# Patient Record
Sex: MALE | URBAN - METROPOLITAN AREA
[De-identification: names, ages, dates, MRNs, and addresses within clinical notes are randomized per-mention and may not be internally consistent; named-entity substitution may affect disease eponyms.]

---

## 2017-03-14 ENCOUNTER — NURSING HOME VISIT (OUTPATIENT)
Dept: GERIATRICS | Facility: CLINIC | Age: 82
End: 2017-03-14
Payer: COMMERCIAL

## 2017-03-14 VITALS
HEART RATE: 77 BPM | SYSTOLIC BLOOD PRESSURE: 125 MMHG | DIASTOLIC BLOOD PRESSURE: 55 MMHG | OXYGEN SATURATION: 96 % | TEMPERATURE: 97 F | RESPIRATION RATE: 20 BRPM

## 2017-03-14 DIAGNOSIS — I69.959 HEMIPLEGIA OF NONDOMINANT SIDE AS LATE EFFECT OF CEREBROVASCULAR DISEASE (H): ICD-10-CM

## 2017-03-14 DIAGNOSIS — I63.411 CEREBRAL INFARCTION DUE TO EMBOLISM OF RIGHT MIDDLE CEREBRAL ARTERY (H): ICD-10-CM

## 2017-03-14 DIAGNOSIS — G81.90 HEMIPLEGIA (H): ICD-10-CM

## 2017-03-14 DIAGNOSIS — Z93.1 G TUBE FEEDINGS (H): ICD-10-CM

## 2017-03-14 DIAGNOSIS — Z71.89 ADVANCED DIRECTIVES, COUNSELING/DISCUSSION: Primary | ICD-10-CM

## 2017-03-14 DIAGNOSIS — R11.2 NON-INTRACTABLE VOMITING WITH NAUSEA, UNSPECIFIED VOMITING TYPE: ICD-10-CM

## 2017-03-14 DIAGNOSIS — I48.0 PAROXYSMAL ATRIAL FIBRILLATION (H): ICD-10-CM

## 2017-03-14 DIAGNOSIS — F43.21 ADJUSTMENT DISORDER WITH DEPRESSED MOOD: ICD-10-CM

## 2017-03-14 DIAGNOSIS — R13.10 DYSPHAGIA, UNSPECIFIED TYPE: ICD-10-CM

## 2017-03-14 PROBLEM — Z79.01 LONG TERM CURRENT USE OF ANTICOAGULANT THERAPY: Status: ACTIVE | Noted: 2017-03-14

## 2017-03-14 PROBLEM — A41.9 SYSTEMIC INFECTION (H): Status: ACTIVE | Noted: 2017-03-14

## 2017-03-14 PROCEDURE — 99207 ZZC CDG-CORRECTLY CODED, REVIEWED AND AGREE: CPT | Performed by: NURSE PRACTITIONER

## 2017-03-14 PROCEDURE — 99310 SBSQ NF CARE HIGH MDM 45: CPT | Performed by: NURSE PRACTITIONER

## 2017-03-14 RX ORDER — AZELASTINE HYDROCHLORIDE 0.5 MG/ML
1 SOLUTION/ DROPS OPHTHALMIC 2 TIMES DAILY
COMMUNITY
Start: 2017-03-10 | End: 2017-08-04

## 2017-03-14 RX ORDER — HYDROCODONE BITARTRATE AND ACETAMINOPHEN 5; 325 MG/1; MG/1
1 TABLET ORAL 3 TIMES DAILY
COMMUNITY
Start: 2017-03-10

## 2017-03-14 NOTE — PROGRESS NOTES
[place] [facility]  PRIMARY CARE PROVIDER AND CLINIC:  Confirmed, No Pcp None  Chief Complaint   Patient presents with     Clinic Care Coordination - Initial     Hospital F/U       HPI:    Tonio Francis is a 84 year old  (8/4/1932),admitted to the [facility]  from [facility].  Hospital stay 3/6/17  through 3/8/17.  Admitted to this facility for  rehab, medical management and nursing care. Current issues are:      Hemiplegia of nondominant side as late effect of cerebrovascular disease (H)  Dysphagia, unspecified type  G tube feedings (H)  Non-intractable vomiting with nausea, unspecified vomiting type  Paroxysmal atrial fibrillation (H)  Adjustment disorder with depressed mood  Tonio suffered a R MCA embolic stroke in October 2016. He has ongoing problems with dysphagia and did have GJ tube inserted. He has been getting continuous feedings through this, but today has complaints of poor appetite, constant diarrhea, and would like to have tf decreased during the day to promote po intake. He does eat mechanical soft diet at meals-of varying amounts, per review of nurinsg notes. He also has a-fib, on coumadin and INR due today. He is observed to be often depressed and is on prozac.      CODE STATUS/ADVANCE DIRECTIVES DISCUSSION:   CPR/Full code   Patient's living condition: lives with spouse    ALLERGIES:Novocain [procaine]  PAST MEDICAL HISTORY:  has no past medical history on file.  PAST SURGICAL HISTORY:  has no past surgical history on file.  FAMILY HISTORY: family history is not on file.  SOCIAL HISTORY:      Post Discharge Medication Reconciliation Status: discharge medications reconciled and changed, per note/orders (see AVS).  Current Outpatient Prescriptions   Medication Sig Dispense Refill     Zolpidem Tartrate (AMBIEN PO) Take 5 mg by mouth At Bedtime       artificial tears OINT ophthalmic ointment Place into both eyes At Bedtime       ASPIRIN PO Take 81 mg by  mouth daily       FLUOXETINE HCL PO Take 40 mg by mouth daily        LANSOPRAZOLE PO Take 30 mg by mouth every morning (before breakfast)       METOPROLOL SUCCINATE ER PO Take 50 mg by mouth daily       MODAFINIL PO Take 200 mg by mouth daily       ROSUVASTATIN CALCIUM PO Take 20 mg by mouth daily       azelastine (OPTIVAR) 0.05 % SOLN ophthalmic solution Place 1 drop Into the left eye 2 times daily       Nutritional Supplements (JEVITY 1.5 BIBI PO) 50cc/hr 7pm through 5am via Gtube       HYDROcodone-acetaminophen (NORCO) 5-325 MG per tablet Take 1 tablet by mouth 3 times daily And take 1 tab PO PRN daily       Acetaminophen (TYLENOL PO) Take 650 mg by mouth every 4 hours as needed for mild pain or fever       Warfarin Sodium (COUMADIN PO) Take 8 mg by mouth daily         Medications reconciled to facility chart and changes were made to reflect current medications as identified as above med list. Below are the changes that were made:   Medications stopped since last EPIC medication reconciliation:   There are no discontinued medications.    Medications started since last [facility] medication reconciliation:  Orders Placed This Encounter   Medications     Zolpidem Tartrate (AMBIEN PO)     Sig: Take 5 mg by mouth At Bedtime     artificial tears OINT ophthalmic ointment     Sig: Place into both eyes At Bedtime     ASPIRIN PO     Sig: Take 81 mg by mouth daily     FLUOXETINE HCL PO     Sig: Take 40 mg by mouth daily      LANSOPRAZOLE PO     Sig: Take 30 mg by mouth every morning (before breakfast)     METOPROLOL SUCCINATE ER PO     Sig: Take 50 mg by mouth daily     MODAFINIL PO     Sig: Take 200 mg by mouth daily     ROSUVASTATIN CALCIUM PO     Sig: Take 20 mg by mouth daily     azelastine (OPTIVAR) 0.05 % SOLN ophthalmic solution     Sig: Place 1 drop Into the left eye 2 times daily     Nutritional Supplements (JEVITY 1.5 BIBI PO)     Sicc/hr 7pm through 5am via Gtube     HYDROcodone-acetaminophen (NORCO) 5-325 MG per  tablet     Sig: Take 1 tablet by mouth 3 times daily And take 1 tab PO PRN daily     Acetaminophen (TYLENOL PO)     Sig: Take 650 mg by mouth every 4 hours as needed for mild pain or fever     Warfarin Sodium (COUMADIN PO)     Sig: Take 8 mg by mouth daily       ROS:  Tonio Francis complains of no pain, no chest pain or pressure, noshortness of breath. Sleep is fair, appetite poor. Tonio Francis does complain of bowel changes, is often with diarrhea. S/he does not complain of bladder changes.  All other systems reviewed and are negative unless otherwise noted in HPI.     Exam:  /55  Pulse 77  Temp 97  F (36.1  C)  Resp 20  SpO2 96%  GENERAL APPEARANCE:  Alert, in no acute distress  HEAD:  Normal, normocephalic, atraumatic  EYE EXAM: normal external eye, conjunctiva, lids on the L; R eye is filmy white and no vision-he reports  Trauma to that eye many years ago  NECK EXAM: supple, no JVD  CHEST/RESP:  respiratory effort normal, lung sounds CTA , no respiratory distress  CV:  Rate reg, rhythm reg, no murmur, no peripheral edema   M/S:   extremities abnormal, gait abnormal-with L weakness, flaccidity and poor mobility, normal muscle tone on the R, and range of motion normal R  SKIN EXAM: CDI, no open areas on buttocks  NEUROLOGIC EXAM: Normal gross motor movement, tone and coordination. No tremor.  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     Dysphagia, unspecified type  G tube feedings (H)  N/V  Is able to eat mechanical soft diet, and feels diarrhea may improve if less tf are used. Will trial holding tf during the day but will need to watch weight and nutrition closely     Paroxysmal atrial fibrillation (H)  LT use of anticoag  On coumadin, INR today . No unusual bleedings, no palpitations, no dizziness, no new dyspnea.     Adjustment disorder with depressed mood  He is very sad, has difficulty holding gaze, quite despondent. Family reports this has been ongoing since stroke last fall. He was started on prozac. Will increase prozac, but may need to consider change to another antidepressant.            Orders:  1.  INR today=2.0  2.  Coumadin 8mg daily.  Dx:  CVA  3.  Cancel Dr. Zavala f/u appt on 3/24.  Will reschedule after DC from TCU if no LTC admission planned.  4.  DC Fluoxetine 20mg  5.  Start Fluoxetine 40mg daily for depression  6.  DC current tube feeding orders  7.  Jevity 1.5 tube feeiding 50ml/hr 7pm through 5am  8.  Continue current flushes etc as ordered    Spoke at length with daughter in law, Deedee and spouse. Reviewed course of action and she and her spouse agree with this plan. They are currently planning LTC admission for Tonio but are willing to consider other options if his strength and mood improve. All questions answered to their satisfaction      Information reviewed:  Medications, vital signs, orders, nursing notes, problem list, hospital information. Facility MDS and care plan reviewed.   Total time spent with patient visit was 35 min including patient visit, review of past records and discussion with family. Greater than 50% of total time spent with counseling and coordinating care.    Electronically signed by:  Brooke Benavidez CNP   [facility]  [phone] cell

## 2017-03-18 ENCOUNTER — TELEPHONE (OUTPATIENT)
Dept: GERIATRICS | Facility: CLINIC | Age: 82
End: 2017-03-18

## 2017-03-18 NOTE — TELEPHONE ENCOUNTER
Nursing report fever started this .2 which is steadily rising and now all the way up to 103  Patient has chills and riggors, not feeling well  Nursing reports unable to do labs or assessment over the weekend  Order:   Send to ER for evaluation

## 2017-03-28 ENCOUNTER — NURSING HOME VISIT (OUTPATIENT)
Dept: GERIATRICS | Facility: CLINIC | Age: 82
End: 2017-03-28
Payer: COMMERCIAL

## 2017-03-28 DIAGNOSIS — Z79.01 LONG TERM CURRENT USE OF ANTICOAGULANT THERAPY: ICD-10-CM

## 2017-03-28 DIAGNOSIS — Z51.81 MONITORING FOR LONG-TERM ANTICOAGULANT USE: ICD-10-CM

## 2017-03-28 DIAGNOSIS — Z79.01 MONITORING FOR LONG-TERM ANTICOAGULANT USE: ICD-10-CM

## 2017-03-28 DIAGNOSIS — I48.0 PAROXYSMAL ATRIAL FIBRILLATION (H): Primary | ICD-10-CM

## 2017-03-28 PROCEDURE — 99308 SBSQ NF CARE LOW MDM 20: CPT | Performed by: NURSE PRACTITIONER

## 2017-03-28 NOTE — PROGRESS NOTES
[place] [facility]    HPI:    Tonio Francis is a 84 year old  (8/4/1932), who is being seen today for an episodic care visit at [facility]. Today's concern is INR/Coumadin management for A. Fib    Bleeding Signs/Symptoms:  None  Thromboembolic Signs/Symptoms:  None    Medication Changes:  No  Dietary Changes:  No  Activity Changes: No  Bacterial/Viral Infection:  No    Missed Coumadin Doses:  None    On ASA: No    Other Concerns:  No    OBJECTIVE:    INR Today: 2.3  Current Dose:  8mg daily    ASSESSMENT:    Therapeutic INR for goal of 2-3    PLAN:    New Dose: No Change      Next INR: 1 week    Electronically Signed By,  Mee Cummings, CNP

## 2017-04-04 ENCOUNTER — NURSING HOME VISIT (OUTPATIENT)
Dept: GERIATRICS | Facility: CLINIC | Age: 82
End: 2017-04-04
Payer: COMMERCIAL

## 2017-04-04 DIAGNOSIS — Z51.81 ENCOUNTER FOR THERAPEUTIC DRUG MONITORING: ICD-10-CM

## 2017-04-04 DIAGNOSIS — I48.0 PAROXYSMAL ATRIAL FIBRILLATION (H): Primary | ICD-10-CM

## 2017-04-04 DIAGNOSIS — Z79.01 LONG TERM CURRENT USE OF ANTICOAGULANT THERAPY: ICD-10-CM

## 2017-04-04 PROCEDURE — 99308 SBSQ NF CARE LOW MDM 20: CPT | Performed by: NURSE PRACTITIONER

## 2017-04-11 VITALS
RESPIRATION RATE: 18 BRPM | TEMPERATURE: 96.8 F | DIASTOLIC BLOOD PRESSURE: 62 MMHG | OXYGEN SATURATION: 94 % | HEART RATE: 74 BPM | WEIGHT: 181.6 LBS | SYSTOLIC BLOOD PRESSURE: 124 MMHG

## 2017-04-11 NOTE — PROGRESS NOTES
[place] [facility]  PRIMARY CARE PROVIDER AND CLINIC:  Brooke Benavidez  [facility] [address] / [place] *  Chief Complaint   Patient presents with     Clinic Care Coordination - Initial     Hospital F/U       HPI:   Obtained from the patient, medical record and from the medial staffs.     Tonio Francis is a 84 year old  (8/4/1932),ad[facility].  Hospital stay 3/6/17  through 3/8/17.  Admitted to this facility for  rehab, medical management and nursing care.     Resident initially admitted to this facility in march 8th after acute on chronic gastroenteritis and deconditioning, developed fever sent to ED was found to be in shock.      Current issues are:      Septic shock (H):  - required pressor and ICU admission, felt to be 2/2 to pna. Resolved.   - no fever or chisll.     Aspiration pneumonitis (H)  - RUL,  also possible bacterial in origin. 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   - Requires assistance with ADLs.     Paroxysmal atrial fibrillation (H)  - Denies CP, SOB or palpitation      Dysphagia, unspecified type  G-tube feeding  - has gastric tube, seen by Speech therapist  who recommends  to allow some oral intake.   - on  mechanical soft nectar thick.     Adjustment disorder with depressed mood:  - Prozac increased from 20 to 40 mg due to ongoing depressive mood.       CODE STATUS/ADVANCE DIRECTIVES DISCUSSION:   CPR/Full code   Patient's living condition: lives with spouse    ALLERGIES:Novocain [procaine]  PAST MEDICAL HISTORY:  has a past medical history of Cerebral infarction (H); Depressive disorder; and Hypertension.  PAST SURGICAL HISTORY:  has no past surgical history on file.  FAMILY HISTORY: Family history is unknown by patient.  SOCIAL  HISTORY:  reports that he does not drink alcohol.    Post Discharge Medication Reconciliation Status: discharge medications reconciled and changed, per note/orders (see AVS).  Current Outpatient Prescriptions   Medication Sig Dispense Refill     Zolpidem Tartrate (AMBIEN PO) Take 5 mg by mouth At Bedtime       artificial tears OINT ophthalmic ointment Place into both eyes At Bedtime       ASPIRIN PO Take 81 mg by mouth daily       FLUOXETINE HCL PO Take 40 mg by mouth daily        LANSOPRAZOLE PO Take 30 mg by mouth every morning (before breakfast)       METOPROLOL SUCCINATE ER PO Take 50 mg by mouth daily       MODAFINIL PO Take 200 mg by mouth daily       ROSUVASTATIN CALCIUM PO Take 20 mg by mouth daily       azelastine (OPTIVAR) 0.05 % SOLN ophthalmic solution Place 1 drop Into the left eye 2 times daily       Nutritional Supplements (JEVITY 1.5 BIBI PO) 50cc/hr 7pm through 5am via Gtube       HYDROcodone-acetaminophen (NORCO) 5-325 MG per tablet Take 1 tablet by mouth 3 times daily And take 1 tab PO PRN daily       Acetaminophen (TYLENOL PO) Take 650 mg by mouth every 4 hours as needed for mild pain or fever       Warfarin Sodium (COUMADIN PO) Take 8 mg by mouth daily         ROS:  10 point ROS of systems including Constitutional, Eyes, Respiratory, Cardiovascular, Gastroenterology, Genitourinary, Integumentary, Muscularskeletal, Psychiatric were all negative except for pertinent positives noted in my HPI.    Exam:  /62  Pulse 74  Temp 96.8  F (36  C)  Resp 18  Wt 181 lb 9.6 oz (82.4 kg)  SpO2 94%  GENERAL APPEARANCE:  Alert, in no distress  ENT:  Mouth and posterior oropharynx normal, moist mucous membranes, edentulous  with denture plateboth levels  EYES:  EOM, conjunctivae, lids, pupils and irises normal  NECK:  No adenopathy,masses or thyromegaly  RESP:  respiratory effort and palpation of chest normal, lungs clear to auscultation , no respiratory distress  CV:  Palpation and auscultation of heart  done , regular rate and rhythm, no murmur, rub, or gallop, no edema  ABDOMEN:  normal bowel sounds, soft, nontender, no hepatosplenomegaly or other masses  M/S:   Gait and station abnormal left sided weakness,   SKIN:  Inspection of skin and subcutaneous tissue baseline, Palpation of skin and subcutaneous tissue baseline, mepiplex over left dorsal surface of the wrist  NEURO:   Cranial nerves 2-12 tested and there is right ptosis  PSYCH:  oriented to person, place and time (year and month only), affect and mood normal    Lab/Diagnostic data:     3/6/17:  WBC 5.8  HGB 11.6L  MCV 96    INR 1.9H     Sodium 138  Potassium 4.3  Chloride 104  [id number] 29  BUN 19H  Creatinine 0.68  Glucose 109H  Calcium 8.4    ASSESSMENT/PLAN:  Septic shock (H)  - resolved.     Pneumonia of right upper lobe due to infectious organism  - resolved    Aspiration pneumonitis (H)  - resolved  - allow oral feeding as tolerated    Hemiplegia of nondominant side as late effect of cerebrovascular disease (H)  - Right MRCA with left sided weakness.  - continue meds    Paroxysmal atrial fibrillation (H)  - on rate control and coumadin. INR followed closely.    Dysphagia, unspecified type  G tube feedings (H)  - Ok to permit oral intake with aspiration precaution.  - continue feeding tube as well to meet the daily requirement of nutrition.       Adjustment disorder with depressed mood  - improved, continue Prozac 40 mg for now, reassess in 3 month for GDR.     Pulmonary nodules  - Ct in 3 months if clinically improved. Discuss with family the plan of care      Frail elderly  - Given three hospital admissions in less than 3 months  Including twice sepsis, with a recent devastating stroke, and fully dependant on ADLs, prognosis is fair. Pt is a candidate for LTC and recommend care and comfort.  - Significant  Deficits requiring NH placement. Requiring extensive assistance from nursing. Requires dynamic transfer to chair.          Orders:  - allow  oral intake )modified mechanical oral diet with thick nectar liquid.     Information reviewed:  Medications, vital signs, orders, nursing notes, problem list, hospital information. Total time spent with patient visit was 45 min including patient visit and review of past records.         Electronically signed by:  Marui Zelaya MD

## 2017-04-12 ENCOUNTER — NURSING HOME VISIT (OUTPATIENT)
Dept: GERIATRICS | Facility: CLINIC | Age: 82
End: 2017-04-12
Payer: COMMERCIAL

## 2017-04-12 DIAGNOSIS — J69.0 ASPIRATION PNEUMONITIS (H): ICD-10-CM

## 2017-04-12 DIAGNOSIS — I48.0 PAROXYSMAL ATRIAL FIBRILLATION (H): ICD-10-CM

## 2017-04-12 DIAGNOSIS — F43.21 ADJUSTMENT DISORDER WITH DEPRESSED MOOD: ICD-10-CM

## 2017-04-12 DIAGNOSIS — J18.9 PNEUMONIA OF RIGHT UPPER LOBE DUE TO INFECTIOUS ORGANISM: ICD-10-CM

## 2017-04-12 DIAGNOSIS — I69.959 HEMIPLEGIA OF NONDOMINANT SIDE AS LATE EFFECT OF CEREBROVASCULAR DISEASE (H): ICD-10-CM

## 2017-04-12 DIAGNOSIS — R65.21 SEPTIC SHOCK (H): Primary | ICD-10-CM

## 2017-04-12 DIAGNOSIS — R54 FRAIL ELDERLY: ICD-10-CM

## 2017-04-12 DIAGNOSIS — Z93.1 G TUBE FEEDINGS (H): ICD-10-CM

## 2017-04-12 DIAGNOSIS — R91.8 PULMONARY NODULES: ICD-10-CM

## 2017-04-12 DIAGNOSIS — R13.10 DYSPHAGIA, UNSPECIFIED TYPE: ICD-10-CM

## 2017-04-12 DIAGNOSIS — A41.9 SEPTIC SHOCK (H): Primary | ICD-10-CM

## 2017-04-12 PROCEDURE — 99306 1ST NF CARE HIGH MDM 50: CPT | Performed by: FAMILY MEDICINE

## 2017-04-12 PROCEDURE — 99207 ZZC CDG-CORRECTLY CODED, REVIEWED AND AGREE: CPT | Performed by: FAMILY MEDICINE

## 2017-04-18 ENCOUNTER — NURSING HOME VISIT (OUTPATIENT)
Dept: GERIATRICS | Facility: CLINIC | Age: 82
End: 2017-04-18
Payer: COMMERCIAL

## 2017-04-18 VITALS
HEART RATE: 81 BPM | RESPIRATION RATE: 20 BRPM | WEIGHT: 183.4 LBS | DIASTOLIC BLOOD PRESSURE: 72 MMHG | SYSTOLIC BLOOD PRESSURE: 138 MMHG | TEMPERATURE: 97.6 F | OXYGEN SATURATION: 94 %

## 2017-04-18 DIAGNOSIS — R13.10 DYSPHAGIA, UNSPECIFIED TYPE: Primary | ICD-10-CM

## 2017-04-18 DIAGNOSIS — I48.0 PAROXYSMAL ATRIAL FIBRILLATION (H): ICD-10-CM

## 2017-04-18 DIAGNOSIS — I63.9 ISCHEMIC EMBOLIC STROKE (H): ICD-10-CM

## 2017-04-18 DIAGNOSIS — Z93.1 G TUBE FEEDINGS (H): ICD-10-CM

## 2017-04-18 DIAGNOSIS — Z79.01 LONG TERM CURRENT USE OF ANTICOAGULANT THERAPY: ICD-10-CM

## 2017-04-18 PROCEDURE — 99309 SBSQ NF CARE MODERATE MDM 30: CPT | Performed by: NURSE PRACTITIONER

## 2017-04-18 NOTE — PROGRESS NOTES
[place] [facility]    Chief Complaint   Patient presents with     Nursing Home Acute       HPI:    Tonio Francis is a 84 year old  (8/4/1932), who is being seen today for an episodic care visit at [facility] [place]. Today's concern is:  Dysphagia, unspecified type  Able to speak well with some hesitation, still having some difficulty with swallowing, and does have tf to meet most nutritional needs. No new complaints of difficulty swallowing, and is continuing to work with SLP    Ischemic embolic stroke (H)  No new symptoms, remains dependent for all cares    G tube feedings (H)  14 hours per day a this time. J tube reported as clogged and not able to be flushed all the time    Paroxysmal atrial fibrillation (H)  Long term current use of anticoagulant therapy  No active bleeding symptoms, has had some changes in nutrition. INR pending for today.        ALLERGIES: Novocain [procaine]  Past Medical, Surgical, Family and Social History reviewed and updated in [facility].    Current Outpatient Prescriptions   Medication Sig Dispense Refill     artificial tears OINT ophthalmic ointment Place into both eyes At Bedtime       ASPIRIN PO Take 81 mg by mouth daily       FLUOXETINE HCL PO Take 40 mg by mouth daily        LANSOPRAZOLE PO Take 30 mg by mouth every morning (before breakfast)       METOPROLOL SUCCINATE ER PO Take 50 mg by mouth daily       MODAFINIL PO Take 200 mg by mouth daily       ROSUVASTATIN CALCIUM PO Take 20 mg by mouth daily       azelastine (OPTIVAR) 0.05 % SOLN ophthalmic solution Place 1 drop Into the left eye 2 times daily       Nutritional Supplements (JEVITY 1.5 BIBI PO) 50cc/hr 7pm through 5am via Gtube       HYDROcodone-acetaminophen (NORCO) 5-325 MG per tablet Take 1 tablet by mouth 3 times daily And take 1 tab PO PRN daily       Acetaminophen (TYLENOL PO) Take 650 mg by mouth every 4 hours as needed for mild pain or fever         Medications reconciled to facility chart and changes were  made to reflect current medications as identified as above med list. Below are the changes that were made:   Medications stopped since last EPIC medication reconciliation:   Medications Discontinued During This Encounter   Medication Reason     Zolpidem Tartrate (AMBIEN PO)      Warfarin Sodium (COUMADIN PO)        Medications started since last [facility] medication reconciliation:  No orders of the defined types were placed in this encounter.    REVIEW OF SYSTEMS:  4 point ROS including Respiratory, CV, GI and , other than that noted in the HPI,  is negative    PHYSICAL EXAM:  /72  Pulse 81  Temp 97.6  F (36.4  C)  Resp 20  Wt 183 lb 6.4 oz (83.2 kg)  SpO2 94%  GENERAL APPEARANCE:  Alert, in no distress  HEAD:  Normal, normocephalic, atraumatic  EYE EXAM: normal external eye, conjunctiva, lids, ALIN  CHEST/RESP:  respiratory effort normal, lung sounds cta , no respiratory distress  CV:  Rate reg, rhythm reg, no murmur, no peripheral edema   M/S: gait abnormal-L weakness, normal muscle tone, and range of motion limited on L  NEUROLOGIC EXAM: Normal gross motor movement, tone and coordination. No tremor.  Cranial nerves 2-12 are normal tested and grossly at patient's baseline  PSYCH:  Alert and oriented to self and surroundings with forgetfulness , affect pleasant , judgement appropriate     RECENT LABS:    No results found for any previous visit.      ASSESSMENT/PLAN:  Dysphagia, unspecified type  Ischemic embolic stroke (H)  G tube feedings (H)  Stable. Continues with TF for most nutrition but is eating small meals daily. Diet Mech soft with nectar thick fluids. No new symptoms. J tube port is sometimes plugged per nursing report-worked today.     Paroxysmal atrial fibrillation (H)  Long term current use of anticoagulant therapy  INR supratherapeutic. no signs of bleeding. May be due to diet change with increase in po food and decrease in TF. Will monitor closely.        ORDERS:  1.  INR 5.6 today  2.  DC  PRN Ambien--no use.  3.  Coumadin 4/18/17 or 4/19/17  4.  Coumadin 5mg starting 4/20/17  5.  Last GJ tube change done 3/22/17 at [facility].  Please schedule to be changed at [facility] in June.  6.  Pancrealipase PRN for GJ tube declogging  7.  INR check 4/25/17      Total time spent with patient visit was 25 min including patient visit and review of past records   Greater than 50% of total time spent with counseling and coordinating care    Electronically signed by  Brooke Benavidez CNP   [facility]  [phone] cell

## 2017-04-25 ENCOUNTER — NURSING HOME VISIT (OUTPATIENT)
Dept: GERIATRICS | Facility: CLINIC | Age: 82
End: 2017-04-25
Payer: COMMERCIAL

## 2017-04-25 VITALS
DIASTOLIC BLOOD PRESSURE: 60 MMHG | SYSTOLIC BLOOD PRESSURE: 126 MMHG | WEIGHT: 184 LBS | BODY MASS INDEX: 27.25 KG/M2 | TEMPERATURE: 97.4 F | RESPIRATION RATE: 18 BRPM | HEIGHT: 69 IN | OXYGEN SATURATION: 94 % | HEART RATE: 74 BPM

## 2017-04-25 DIAGNOSIS — I48.0 PAROXYSMAL ATRIAL FIBRILLATION (H): ICD-10-CM

## 2017-04-25 DIAGNOSIS — Z79.01 LONG TERM CURRENT USE OF ANTICOAGULANT THERAPY: ICD-10-CM

## 2017-04-25 DIAGNOSIS — I69.959 HEMIPLEGIA OF NONDOMINANT SIDE AS LATE EFFECT OF CEREBROVASCULAR DISEASE (H): ICD-10-CM

## 2017-04-25 DIAGNOSIS — I63.9 ISCHEMIC EMBOLIC STROKE (H): ICD-10-CM

## 2017-04-25 DIAGNOSIS — Z93.1 G TUBE FEEDINGS (H): Primary | ICD-10-CM

## 2017-04-25 PROCEDURE — 99309 SBSQ NF CARE MODERATE MDM 30: CPT | Performed by: NURSE PRACTITIONER

## 2017-04-25 NOTE — PROGRESS NOTES
[facility]    Chief Complaint   Patient presents with     Nursing Home Acute       HPI:    Tonio Francis is a 84 year old  (8/4/1932), who is being seen today for an episodic care visit at [facility] [place]. Today's concern is:  G tube feedings (H)  Tolerating po intake well, weight stable. Dietician recommends holding of TF and encouraging additional po intake .     Ischemic embolic stroke (H)  Hemiplegia of nondominant side as late effect of cerebrovascular disease (H)  No new sided weakness, no new symptoms noted     Paroxysmal atrial fibrillation (H)  Long term current use of anticoagulant therapy  On coumadin, no symptoms bleeding noted.        ALLERGIES: Novocain [procaine]  Past Medical, Surgical, Family and Social History reviewed and updated in [facility].    Current Outpatient Prescriptions   Medication Sig Dispense Refill     Warfarin Sodium (COUMADIN PO) Take 5 mg by mouth daily       artificial tears OINT ophthalmic ointment Place into both eyes At Bedtime       ASPIRIN PO Take 81 mg by mouth daily       FLUOXETINE HCL PO Take 40 mg by mouth daily        LANSOPRAZOLE PO Take 30 mg by mouth every morning (before breakfast)       METOPROLOL SUCCINATE ER PO Take 50 mg by mouth daily       MODAFINIL PO Take 200 mg by mouth daily       ROSUVASTATIN CALCIUM PO Take 20 mg by mouth daily       azelastine (OPTIVAR) 0.05 % SOLN ophthalmic solution Place 1 drop Into the left eye 2 times daily       Nutritional Supplements (JEVITY 1.5 BIBI PO) 50cc/hr 7pm through 5am via Gtube       HYDROcodone-acetaminophen (NORCO) 5-325 MG per tablet Take 1 tablet by mouth 3 times daily And take 1 tab PO PRN daily       Acetaminophen (TYLENOL PO) Take 650 mg by mouth every 4 hours as needed for mild pain or fever         Medications reconciled to facility chart and changes were made to reflect current medications as identified as above med list. Below are the changes that were made:   Medications stopped since  "last EPIC medication reconciliation:   There are no discontinued medications.    Medications started since last [facility] medication reconciliation:  No orders of the defined types were placed in this encounter.    REVIEW OF SYSTEMS:  Unobtainable secondary to cognitive impairment or aphasia, but today pt reports no new concerns.     PHYSICAL EXAM:  /60  Pulse 74  Temp 97.4  F (36.3  C)  Resp 18  Ht 5' 9\" (1.753 m)  Wt 184 lb (83.5 kg)  SpO2 94%  BMI 27.17 kg/m2  GENERAL APPEARANCE:  Alert, in no distress  HEAD:  Normal, normocephalic, atraumatic  EYE EXAM: normal external eye, conjunctiva, lids, ALIN  NECK EXAM: supple, no JVD  CHEST/RESP:  respiratory effort normal, lung sounds CTA , no respiratory distress  CV:  Rate reg, rhythm reg, no murmur, no peripheral edema   GI/ABDOMEN:   soft, nontender, no palpable masses  M/S:   extremities normal, gait abnormal-unable to ambulate, normal muscle tone, and range of motion normal on unaffected side  SKIN EXAM: Gtube site CDI without inflammation  NEUROLOGIC EXAM: Normal gross motor movement, tone and coordination. No tremor.  Cranial nerves 2-12 are normal tested and grossly at patient's baseline  PSYCH:  Alert and oriented to self and surroundings, affect pleasant , judgement appropriate     RECENT LABS:      3/6/17:  WBC 5.8  HGB 11.6L  MCV 96    INR 1.9H      Sodium 138  Potassium 4.3  Chloride 104  QL3Uaocc 29  BUN 19H  Creatinine 0.68  Glucose 109H  Calcium 8.4    ASSESSMENT/PLAN:  G tube feedings (H)  He is eating well, continues with ST. Will trial off TF, encourage additional po intake with supplement.     Ischemic embolic stroke (H)  Hemiplegia of nondominant side as late effect of cerebrovascular disease (H)  No new symptoms. Stable. Monitor.     Paroxysmal atrial fibrillation (H)  Long term current use of anticoagulant therapy  INR therapeutic. no signs of bleeding. The current medical regimen is effective;  continue present plan and " medications.        ORDERS:  1.  INR today=2.1  2.  Coumadin 5mg PO daily for Afib  3.  Recheck INR 5/2/17  4.  Dc enteral feed and all current associated flushes  5.  2 Bob supplement 240cc BID dx supplement  6.  Flush G&J tubes with 60ml water BID to keep patent  7.  DC dressing change to GJ tube site every shift  8.  GJ tube dressing change daily :  Cleanse then apply dry 4x4 split dressing      Total time spent with patient visit was 25 min including patient visit and review of past records   Greater than 50% of total time spent with counseling and coordinating care    Electronically signed by  Brooke Benavidez CNP   [facility]  [phone] cell

## 2017-05-02 ENCOUNTER — NURSING HOME VISIT (OUTPATIENT)
Dept: GERIATRICS | Facility: CLINIC | Age: 82
End: 2017-05-02
Payer: COMMERCIAL

## 2017-05-02 DIAGNOSIS — I48.0 PAROXYSMAL ATRIAL FIBRILLATION (H): Primary | ICD-10-CM

## 2017-05-02 DIAGNOSIS — Z79.01 LONG TERM CURRENT USE OF ANTICOAGULANT THERAPY: ICD-10-CM

## 2017-05-02 PROCEDURE — 99308 SBSQ NF CARE LOW MDM 20: CPT | Performed by: NURSE PRACTITIONER

## 2017-05-09 VITALS
WEIGHT: 184.6 LBS | BODY MASS INDEX: 27.26 KG/M2 | SYSTOLIC BLOOD PRESSURE: 122 MMHG | OXYGEN SATURATION: 94 % | RESPIRATION RATE: 20 BRPM | HEART RATE: 80 BPM | DIASTOLIC BLOOD PRESSURE: 65 MMHG | TEMPERATURE: 97.6 F

## 2017-05-09 NOTE — PROGRESS NOTES
[facility]    Chief Complaint   Patient presents with     [employer] Regulatory       HPI:    Tonio Francis is a 84 year old  (8/4/1932), who is being seen today for a federally mandated E/M visit at [facility] [place]. Today's concerns are:   Hemiplegia of nondominant side as late effect of cerebrovascular disease (H)  No new symptoms. L weakness. Alert, oriented.     Dysphagia, unspecified type  G tube feedings (H)  Some difficulty with dysphagia, currently able to meet all nutritional needs with po intake-food and supplements. Weight stable. GT remains in place    Diarrhea, unspecified type  Noted to have large liquid/loose diarrhea several times per day. Trial of DC of fiber product has not been helpful. No pain, no fever,     Adjustment disorder with depressed mood  On  Fluoxetine, no complaints of depression, alert and able to make needs known.     ALLERGIES: Novocain [procaine]  PAST MEDICAL HISTORY:  has a past medical history of Cerebral infarction (H); Depressive disorder; and Hypertension.  PAST SURGICAL HISTORY:  has no past surgical history on file.  FAMILY HISTORY: Family history is unknown by patient.  SOCIAL HISTORY:  reports that he does not drink alcohol.    MEDICATIONS:  Current Outpatient Prescriptions   Medication Sig Dispense Refill     ketotifen (ZADITOR/REFRESH ANTI-ITCH) 0.025 % SOLN ophthalmic solution Place 1 drop Into the left eye 2 times daily       Nutritional Supplements (NUTRITIONAL SUPPLEMENT PO) 2 calorie supplement.  240cc two times a day       Warfarin Sodium (COUMADIN PO) Take 5 mg by mouth daily       artificial tears OINT ophthalmic ointment Place into both eyes At Bedtime       ASPIRIN PO Take 81 mg by mouth daily       FLUOXETINE HCL PO Take 20 mg by mouth daily        LANSOPRAZOLE PO Take 30 mg by mouth every morning (before breakfast)       METOPROLOL SUCCINATE ER PO 25 mg by Gastric Tube route daily        MODAFINIL PO Take 200 mg by mouth daily        ROSUVASTATIN CALCIUM PO Take 20 mg by mouth daily       azelastine (OPTIVAR) 0.05 % SOLN ophthalmic solution Place 1 drop Into the left eye 2 times daily       HYDROcodone-acetaminophen (NORCO) 5-325 MG per tablet Take 1 tablet by mouth 3 times daily And take 1 tab PO PRN daily       Acetaminophen (TYLENOL PO) Take 650 mg by mouth every 4 hours as needed for mild pain or fever         Medications reconciled to facility chart and changes were made to reflect current medications as identified as above med list. Below are the changes that were made:   Medications stopped since last EPIC medication reconciliation:   Medications Discontinued During This Encounter   Medication Reason     Nutritional Supplements (JEVITY 1.5 BIBI PO)        Medications started since last [facility] medication reconciliation:  Orders Placed This Encounter   Medications     ketotifen (ZADITOR/REFRESH ANTI-ITCH) 0.025 % SOLN ophthalmic solution     Sig: Place 1 drop Into the left eye 2 times daily     Nutritional Supplements (NUTRITIONAL SUPPLEMENT PO)     Si calorie supplement.  240cc two times a day         Case Management:  I have reviewed the care plan and MDS and do agree with the plan. Patient's desire to return to the community is present, but is not able due to care needs .  Information reviewed:  Medications, vital signs, orders, and nursing notes.    ROS:  4 point ROS including Respiratory, CV, GI and , other than that noted in the HPI,  is negative    Exam:  /65  Pulse 80  Temp 97.6  F (36.4  C)  Resp 20  Wt 184 lb 9.6 oz (83.7 kg)  SpO2 94%  BMI 27.26 kg/m2  GENERAL APPEARANCE:  Alert, in no distress  HEAD:  Normal, normocephalic, atraumatic  EYE EXAM: normal external eye, conjunctiva, lids, ALIN  NECK EXAM: stiff, no JVD  CHEST/RESP:  respiratory effort normal, lung sounds CTA , no respiratory distress  CV:  Rate reg, rhythm reg, no murmur, no peripheral edema   GI/ABDOMEN:  normal bowel sounds, soft, nontender, no  palpable masses   M/S:   extremities normal, gait abnormal-does not ambulate-wheelchair for mobility, normal muscle tone, and range of motion normal   SKIN EXAM: CDI, GT site CDI  NEUROLOGIC EXAM: Normal gross motor movement, tone and coordination. No tremor.  Cranial nerves 2-12 are normal tested and grossly at patient's baseline  PSYCH:  Alert and oriented to person-place-time with forgetfulness, affect pleasant , judgement appropriate     Lab/Diagnostic data:    Reviewed in facility records       ASSESSMENT/PLAN  Hemiplegia of nondominant side as late effect of cerebrovascular disease (H)  No new symptoms. Stable. Monitor. Alert, awake-sleeping well at night.     Dysphagia, unspecified type  G tube feedings (H)  Currently eating all po , weight stable. The current medical regimen is effective;  continue present plan and medications.     Diarrhea, unspecified type  Unknown etiology. Eating mostly regular food, with supplements. No improvement with discontinuation of fiber tablets. Medication side effects reviewed with pharmacy and modafinil may have side effect of diarrhea. Will stop this, get stool culture to rule out infection though he is not overly ill.     Adjustment disorder with depressed mood  Mood stable, last PHQ9 5/27. The current medical regimen is effective;  continue present plan and medications.      Orders:  1. Stool sample for c diff Dx diarrhea  2. Imodium 2 mg 1 tab QID prn po diarrhea  3. DC Modafinil   4. Lab draw on 5/18/17 - CBC and BMP Dx anemia and HTN    Total time spent with patient visit was 25 min including patient visit and review of past records.Greater than 50% of total time spent with counseling and coordinating care.    Electronically signed by:  Brooke Benavidez CNP   [facility]  [phone] cell

## 2017-05-10 ENCOUNTER — NURSING HOME VISIT (OUTPATIENT)
Dept: GERIATRICS | Facility: CLINIC | Age: 82
End: 2017-05-10
Payer: COMMERCIAL

## 2017-05-10 DIAGNOSIS — R13.10 DYSPHAGIA, UNSPECIFIED TYPE: ICD-10-CM

## 2017-05-10 DIAGNOSIS — R19.7 DIARRHEA, UNSPECIFIED TYPE: ICD-10-CM

## 2017-05-10 DIAGNOSIS — I69.959 HEMIPLEGIA OF NONDOMINANT SIDE AS LATE EFFECT OF CEREBROVASCULAR DISEASE (H): Primary | ICD-10-CM

## 2017-05-10 DIAGNOSIS — Z93.1 G TUBE FEEDINGS (H): ICD-10-CM

## 2017-05-10 DIAGNOSIS — F43.21 ADJUSTMENT DISORDER WITH DEPRESSED MOOD: ICD-10-CM

## 2017-05-10 PROCEDURE — 99309 SBSQ NF CARE MODERATE MDM 30: CPT | Performed by: NURSE PRACTITIONER

## 2017-05-10 RX ORDER — LOPERAMIDE HCL 2 MG
2 CAPSULE ORAL 4 TIMES DAILY PRN
COMMUNITY

## 2017-05-11 ENCOUNTER — HOSPITAL LABORATORY (OUTPATIENT)
Facility: OTHER | Age: 82
End: 2017-05-11

## 2017-05-15 LAB
C DIFF TOX B STL QL: NORMAL
SPECIMEN SOURCE: NORMAL

## 2017-05-16 ENCOUNTER — NURSING HOME VISIT (OUTPATIENT)
Dept: GERIATRICS | Facility: CLINIC | Age: 82
End: 2017-05-16
Payer: COMMERCIAL

## 2017-05-16 VITALS
WEIGHT: 183.2 LBS | RESPIRATION RATE: 20 BRPM | OXYGEN SATURATION: 94 % | DIASTOLIC BLOOD PRESSURE: 65 MMHG | SYSTOLIC BLOOD PRESSURE: 122 MMHG | TEMPERATURE: 97.6 F | BODY MASS INDEX: 27.05 KG/M2 | HEART RATE: 80 BPM

## 2017-05-16 DIAGNOSIS — R19.7 DIARRHEA, UNSPECIFIED TYPE: Primary | ICD-10-CM

## 2017-05-16 DIAGNOSIS — I48.0 PAROXYSMAL ATRIAL FIBRILLATION (H): ICD-10-CM

## 2017-05-16 DIAGNOSIS — Z79.01 LONG TERM CURRENT USE OF ANTICOAGULANT THERAPY: ICD-10-CM

## 2017-05-16 PROCEDURE — 99309 SBSQ NF CARE MODERATE MDM 30: CPT | Performed by: NURSE PRACTITIONER

## 2017-05-16 NOTE — PROGRESS NOTES
[facility]    Chief Complaint   Patient presents with     Nursing Home Acute       HPI:    Tonio Francis is a 84 year old  (8/4/1932), who is being seen today for an episodic care visit at [facility]. Today's concern is:  Diarrhea  Ongoing diarrhea, 1-3 large loose stools per day, incontinent much of the time.     Paroxysmal atrial fibrillation (H)  LTAC  No bleeding, no palpitations, no new dyspnea.       REVIEW OF SYSTEMS:  4 point ROS including Respiratory, CV, GI and , other than that noted in the HPI,  is negative    /65  Pulse 80  Temp 97.6  F (36.4  C)  Resp 20  Wt 183 lb 3.2 oz (83.1 kg)  SpO2 94%  BMI 27.05 kg/m2  GENERAL APPEARANCE:  Alert, in no distress    ASSESSMENT/PLAN:  Diarrhea  Ongoing diarrhea, Cdiff negative, not overtly ill. Will schedule imodium and monitor.     Paroxysmal atrial fibrillation (H)  INR therapeutic. No signs of bleeding. The current medical regimen is effective;  continue present plan and medications.      Orders:  1.  INR today 2.6  2.  Coumadin 5mg PO daily for AFib  3.  Recheck INR on 6/6/17  4.  Imodium 2mg daily PO and continue 2mg QID PO PRN.  Dx:  Chronic diarrhea--C-diff negative    Time 25 min    HANANE Regalado CNP

## 2017-05-18 ENCOUNTER — HOSPITAL LABORATORY (OUTPATIENT)
Facility: OTHER | Age: 82
End: 2017-05-18

## 2017-05-18 LAB
ANION GAP SERPL CALCULATED.3IONS-SCNC: 8 MMOL/L (ref 3–14)
BUN SERPL-MCNC: 9 MG/DL (ref 7–30)
CALCIUM SERPL-MCNC: 8.9 MG/DL (ref 8.5–10.1)
CHLORIDE SERPL-SCNC: 107 MMOL/L (ref 94–109)
CO2 SERPL-SCNC: 29 MMOL/L (ref 20–32)
CREAT SERPL-MCNC: 0.7 MG/DL (ref 0.66–1.25)
ERYTHROCYTE [DISTWIDTH] IN BLOOD BY AUTOMATED COUNT: 14.1 % (ref 10–15)
GFR SERPL CREATININE-BSD FRML MDRD: NORMAL ML/MIN/1.7M2
GLUCOSE SERPL-MCNC: 85 MG/DL (ref 70–99)
HCT VFR BLD AUTO: 39.9 % (ref 40–53)
HGB BLD-MCNC: 12.9 G/DL (ref 13.3–17.7)
MCH RBC QN AUTO: 31 PG (ref 26.5–33)
MCHC RBC AUTO-ENTMCNC: 32.3 G/DL (ref 31.5–36.5)
MCV RBC AUTO: 96 FL (ref 78–100)
PLATELET # BLD AUTO: 223 10E9/L (ref 150–450)
POTASSIUM SERPL-SCNC: 3.6 MMOL/L (ref 3.4–5.3)
RBC # BLD AUTO: 4.16 10E12/L (ref 4.4–5.9)
SODIUM SERPL-SCNC: 144 MMOL/L (ref 133–144)
WBC # BLD AUTO: 5.8 10E9/L (ref 4–11)

## 2017-06-06 ENCOUNTER — NURSING HOME VISIT (OUTPATIENT)
Dept: GERIATRICS | Facility: CLINIC | Age: 82
End: 2017-06-06
Payer: COMMERCIAL

## 2017-06-06 DIAGNOSIS — Z51.81 ENCOUNTER FOR THERAPEUTIC DRUG MONITORING: ICD-10-CM

## 2017-06-06 DIAGNOSIS — Z79.01 LONG TERM CURRENT USE OF ANTICOAGULANT THERAPY: ICD-10-CM

## 2017-06-06 DIAGNOSIS — I63.9 ISCHEMIC EMBOLIC STROKE (H): Primary | ICD-10-CM

## 2017-06-06 PROCEDURE — 99308 SBSQ NF CARE LOW MDM 20: CPT | Performed by: NURSE PRACTITIONER

## 2017-06-06 NOTE — PROGRESS NOTES
[facility]    HPI:    Tonio Francis is a 84 year old  (8/4/1932), who is being seen today for an episodic care visit at [facility] [place].   HPI information obtained from: facility chart records and facility staff. Today's concern is INR/Coumadin management for A. Fib    Bleeding Signs/Symptoms:  None  Thromboembolic Signs/Symptoms:  None    Medication Changes:  No  Dietary Changes:  No  Activity Changes: No  Bacterial/Viral Infection:  No    Missed Coumadin Doses:  None    On ASA: No    Other Concerns:  No    OBJECTIVE:    INR Today:  1.8  Current Dose:  5mg daily  SubtherapeuticASSESSMENT:     Ischemic embolic stroke (H)  Long term current use of anticoagulant therapy  Encounter for therapeutic drug monitoring  Subtherapeutic INR for goal of 2-3    PLAN:    New Dose: 7.5mg daily      Next INR: 6/12/17    Brooke Benavidez CNP   [facility]  [phone] voice mail

## 2017-06-12 ENCOUNTER — NURSING HOME VISIT (OUTPATIENT)
Dept: GERIATRICS | Facility: CLINIC | Age: 82
End: 2017-06-12
Payer: COMMERCIAL

## 2017-06-12 DIAGNOSIS — I63.9 ISCHEMIC EMBOLIC STROKE (H): Primary | ICD-10-CM

## 2017-06-12 DIAGNOSIS — Z79.01 LONG TERM CURRENT USE OF ANTICOAGULANT THERAPY: ICD-10-CM

## 2017-06-12 DIAGNOSIS — Z51.81 ENCOUNTER FOR THERAPEUTIC DRUG MONITORING: ICD-10-CM

## 2017-06-12 PROCEDURE — 99207 ZZC CDG-CORRECTLY CODED, REVIEWED AND AGREE: CPT | Performed by: NURSE PRACTITIONER

## 2017-06-12 PROCEDURE — 99308 SBSQ NF CARE LOW MDM 20: CPT | Performed by: NURSE PRACTITIONER

## 2017-06-12 NOTE — PROGRESS NOTES
[facility]    HPI:    Tonio Francis is a 84 year old  (8/4/1932), who is being seen today for an episodic care visit at [facility] [place].   HPI information obtained from: facility chart records and facility staff. Today's concern is INR/Coumadin management for A. Fib, post stroke    Bleeding Signs/Symptoms:  None  Thromboembolic Signs/Symptoms:  None    Medication Changes:  No  Dietary Changes:  No  Activity Changes: No  Bacterial/Viral Infection:  No    Missed Coumadin Doses:  None    On ASA: No    Other Concerns:  No    OBJECTIVE:    INR Today:  4.2  Current Dose:  7.5 mg daily    ASSESSMENT:  Ischemic embolic stroke (H)  Long term current use of anticoagulant therapy  Encounter for therapeutic drug monitoring    Supratherapeutic INR for goal of 2-3    PLAN:    New Dose: hold today then 7.5mg PO Tuesday and Friday, 5mg PO all other days      Next INR: 1 week 6-19-17          HANANE Gaspar CNP

## 2017-06-12 NOTE — MR AVS SNAPSHOT
"              After Visit Summary   6/12/2017    Tonio Francis    MRN: [id number]           Patient Information     Date Of Birth          8/4/1932        Visit Information        Provider Department      6/12/2017 9:00 AM Shira Bar APRN CNP [facility]        Today's Diagnoses     Ischemic embolic stroke (H)    -  1    Long term current use of anticoagulant therapy        Encounter for therapeutic drug monitoring           Follow-ups after your visit        Your next 10 appointments already scheduled     Jun 14, 2017  8:00 AM CDT   Nursing Home with Mauri Zelaya MD   [facility]    [address] [place] [phone]              Who to contact     If you have questions or need follow up information about today's clinic visit or your schedule please contact [facility] directly at [phone].  Normal or non-critical lab and imaging results will be communicated to you by MyChart, letter or phone within 4 business days after the clinic has received the results. If you do not hear from us within 7 days, please contact the clinic through MyChart or phone. If you have a critical or abnormal lab result, we will notify you by phone as soon as possible.  Submit refill requests through [facility] [employer] or call your pharmacy and they will forward the refill request to us. Please allow 3 business days for your refill to be completed.          Additional Information About Your Visit        MyChart Information     [facility] [employer] lets you send messages to your doctor, view your test results, renew your prescriptions, schedule appointments and more. To sign up, go to [email or web address] . Click on \"Log in\" on the left side of the screen, which will take you to the Welcome page. Then click on \"Sign up Now\" on the right side of the page.     You will be asked to enter the access code listed below, as well as some personal " information. Please follow the directions to create your username and password.     Your access code is: [email or web address]  Expires: 9/10/2017  2:50 PM     Your access code will  in 90 days. If you need help or a new code, please call your [facility] clinic or [phone].        Care EveryWhere ID     This is your Care EveryWhere ID. This could be used by other organizations to access your [facility] medical records  [id number]         Blood Pressure from Last 3 Encounters:   17 122/65   17 122/65   17 126/60    Weight from Last 3 Encounters:   17 183 lb 3.2 oz (83.1 kg)   17 184 lb 9.6 oz (83.7 kg)   17 184 lb (83.5 kg)              Today, you had the following     No orders found for display       Primary Care Provider Office Phone #    Shira HANANE Dumont -[phone]       [facility] [address] [address]        Thank you!     Thank you for choosing [facility]  for your care. Our goal is always to provide you with excellent care. Hearing back from our patients is one way we can continue to improve our services. Please take a few minutes to complete the written survey that you may receive in the mail after your visit with us. Thank you!             Your Updated Medication List - Protect others around you: Learn how to safely use, store and throw away your medicines at www.disposemymeds.org.          This list is accurate as of: 17  2:50 PM.  Always use your most recent med list.                   Brand Name Dispense Instructions for use    artificial tears Oint ophthalmic ointment      Place into both eyes At Bedtime       ASPIRIN PO      Take 81 mg by mouth daily       azelastine 0.05 % Soln ophthalmic solution    OPTIVAR     Place 1 drop Into the left eye 2 times daily       FLUOXETINE HCL PO      Take 20 mg by mouth daily       HYDROcodone-acetaminophen 5-325 MG per tablet    NORCO     Take 1 tablet by mouth 3 times  daily And take 1 tab PO PRN daily       ketotifen 0.025 % Soln ophthalmic solution    ZADITOR/REFRESH ANTI-ITCH     Place 1 drop Into the left eye 2 times daily       LANSOPRAZOLE PO      Take 30 mg by mouth every morning (before breakfast)       loperamide 2 MG capsule    IMODIUM     Take 2 mg by mouth 4 times daily as needed for diarrhea And 2mg daily       METOPROLOL SUCCINATE ER PO      25 mg by Gastric Tube route daily       NUTRITIONAL SUPPLEMENT PO      2 calorie supplement.  240cc two times a day       ROSUVASTATIN CALCIUM PO      Take 20 mg by mouth daily       TYLENOL PO      Take 650 mg by mouth every 4 hours as needed for mild pain or fever       WARFARIN SODIUM PO      Take 7.5 mg by mouth daily

## 2017-06-13 VITALS
BODY MASS INDEX: 26.64 KG/M2 | HEART RATE: 78 BPM | DIASTOLIC BLOOD PRESSURE: 66 MMHG | OXYGEN SATURATION: 95 % | TEMPERATURE: 97.6 F | WEIGHT: 180.4 LBS | SYSTOLIC BLOOD PRESSURE: 123 MMHG

## 2017-06-13 NOTE — PROGRESS NOTES
[facility]    Chief Complaint   Patient presents with     senior care Regulatory       HPI:    Tonio Francis is a 84 year old  (8/4/1932), who is being seen today for a federally mandated E/M visit at [facility] [place].  HPI information obtained from: facility chart records, facility staff, patient report and [facility] chart review. Today's concerns are:  Ischemic embolic stroke (H)  Hemiplegia of nondominant side as late effect of cerebrovascular disease (H)  - with left sided weakness, no new issue.   - continues to requires assistance with ADLs.     Paroxysmal atrial fibrillation (H)  Long term current use of anticoagulant therapy  - denies chest pain or palpitation.     G tube feedings (H)  - reports can tolerates oral feeding now, and wants to the G tube out.       ==============================================  - Past Medical, social, family histories, medications, and allergies reviewed and updated    MEDICATIONS:  Current Outpatient Prescriptions   Medication Sig Dispense Refill     OMEPRAZOLE PO Take 20 mg by mouth every morning       loperamide (IMODIUM) 2 MG capsule Take 2 mg by mouth 4 times daily as needed for diarrhea And 2mg daily       ketotifen (ZADITOR/REFRESH ANTI-ITCH) 0.025 % SOLN ophthalmic solution Place 1 drop Into the left eye 2 times daily       Nutritional Supplements (NUTRITIONAL SUPPLEMENT PO) 2 calorie supplement.  240cc two times a day       artificial tears OINT ophthalmic ointment Place into both eyes At Bedtime       ASPIRIN PO Take 81 mg by mouth daily       FLUOXETINE HCL PO Take 20 mg by mouth daily        METOPROLOL SUCCINATE ER PO 25 mg by Gastric Tube route daily        ROSUVASTATIN CALCIUM PO Take 20 mg by mouth daily       azelastine (OPTIVAR) 0.05 % SOLN ophthalmic solution Place 1 drop Into the left eye 2 times daily       HYDROcodone-acetaminophen (NORCO) 5-325 MG per tablet Take 1 tablet by mouth 3 times daily And take 1 tab PO PRN daily        Acetaminophen (TYLENOL PO) Take 650 mg by mouth every 4 hours as needed for mild pain or fever       Medications reviewed:  Reviewed in the chart and EHR.      Case Management:  I have reviewed the care plan and MDS and do agree with the plan. Patient's desire to return to the community is not present.  Information reviewed:  Medications, vital signs, orders, and nursing notes.    ROS:  10 point ROS of systems including Constitutional, Eyes, Respiratory, Cardiovascular, Gastroenterology, Genitourinary, Integumentary, Muscularskeletal, Psychiatric were all negative except for pertinent positives noted in my HPI.    Exam:  Vitals: /66  Pulse 78  Temp 97.6  F (36.4  C)  Wt 180 lb 6.4 oz (81.8 kg)  SpO2 95%  BMI 26.64 kg/m2  BMI= Body mass index is 26.64 kg/(m^2).  GENERAL APPEARANCE:  Alert, in no distress  ENT:  Mouth and posterior oropharynx normal, moist mucous membranes, edentulous  with denture plateboth levels  EYES:  EOM, conjunctivae, lids, pupils and irises normal  NECK:  No adenopathy,masses or thyromegaly  RESP:  respiratory effort and palpation of chest normal, lungs clear to auscultation , no respiratory distress  CV:  Palpation and auscultation of heart done , regular rate and rhythm, no murmur, rub, or gallop, no edema  ABDOMEN:  normal bowel sounds, soft, nontender, no hepatosplenomegaly or other masses  M/S:   Gait and station abnormal. LUE in sling. .   SKIN:  Inspection of skin and subcutaneous tissue baseline, Palpation of skin and subcutaneous tissue baseline, Mepilex over left dorsal surface of the wrist  NEURO:   Cranial nerves 2-12 tested and there is right ptosis, right facial droop. Ms strength 0/5 HEENA.   PSYCH:  AAOx3, affect and mood normal. Speech response delayed.     Lab/Diagnostic data:  Reviewed in the chart and EHR.      -----------------------------------------------------------------------------------------------------      ASSESSMENT/PLAN  Ischemic embolic stroke  (H)  Hemiplegia of nondominant side as late effect of cerebrovascular disease (H)  - Right MCA.  - Left sided weakness  - Requires assistance with ADLs.     Chronic atrial fibrillation (H)  Long term current use of anticoagulant therapy  -  on coumadin with INR followed closely      G tube feedings (H)  - not using now, discussed with the Resident about keeping it for now, and continue to monitor how she does with the po intake. Resident is in agreement to keep it for now.     Frail elderly  - Significant  Deficits requiring NH placement. Requiring extensive assistance from nursing. Up for meals only o/w spends the day resting in bed    Pulmonary Nodule:  - CT in October, if Resident and family wants to proceed.   - Recommend against aggressive measures given limited life expectancy.    Orders:  - The current medical regimen is effective;  continue present plan and medications.      Total time spent with patient visit at the skilled nursing facility was 35 min including patient visit, review of past records and discussin g wt the medical provider about the plan of care. . Greater than 50% of total time spent with counseling and coordinating care due to multiple comorbiditeis.     Electronically signed by:  Mauri Zelaya MD

## 2017-06-14 ENCOUNTER — NURSING HOME VISIT (OUTPATIENT)
Dept: GERIATRICS | Facility: CLINIC | Age: 82
End: 2017-06-14
Payer: COMMERCIAL

## 2017-06-14 DIAGNOSIS — R54 FRAIL ELDERLY: ICD-10-CM

## 2017-06-14 DIAGNOSIS — Z93.1 G TUBE FEEDINGS (H): ICD-10-CM

## 2017-06-14 DIAGNOSIS — R91.8 PULMONARY NODULES: ICD-10-CM

## 2017-06-14 DIAGNOSIS — Z79.01 LONG TERM CURRENT USE OF ANTICOAGULANT THERAPY: ICD-10-CM

## 2017-06-14 DIAGNOSIS — I48.0 PAROXYSMAL ATRIAL FIBRILLATION (H): ICD-10-CM

## 2017-06-14 DIAGNOSIS — I69.959 HEMIPLEGIA OF NONDOMINANT SIDE AS LATE EFFECT OF CEREBROVASCULAR DISEASE (H): Primary | ICD-10-CM

## 2017-06-14 DIAGNOSIS — I63.9 ISCHEMIC EMBOLIC STROKE (H): ICD-10-CM

## 2017-06-14 PROCEDURE — 99207 ZZC CDG-CORRECTLY CODED, REVIEWED AND AGREE: CPT | Performed by: FAMILY MEDICINE

## 2017-06-14 PROCEDURE — 99310 SBSQ NF CARE HIGH MDM 45: CPT | Performed by: FAMILY MEDICINE

## 2017-06-19 ENCOUNTER — NURSING HOME VISIT (OUTPATIENT)
Dept: GERIATRICS | Facility: CLINIC | Age: 82
End: 2017-06-19
Payer: COMMERCIAL

## 2017-06-19 VITALS
SYSTOLIC BLOOD PRESSURE: 123 MMHG | BODY MASS INDEX: 26.73 KG/M2 | WEIGHT: 181 LBS | OXYGEN SATURATION: 95 % | DIASTOLIC BLOOD PRESSURE: 66 MMHG | TEMPERATURE: 97.6 F | RESPIRATION RATE: 18 BRPM | HEART RATE: 78 BPM

## 2017-06-19 DIAGNOSIS — I63.9 ISCHEMIC EMBOLIC STROKE (H): ICD-10-CM

## 2017-06-19 DIAGNOSIS — Z51.81 ENCOUNTER FOR THERAPEUTIC DRUG MONITORING: ICD-10-CM

## 2017-06-19 DIAGNOSIS — I48.0 PAROXYSMAL ATRIAL FIBRILLATION (H): ICD-10-CM

## 2017-06-19 DIAGNOSIS — Z79.01 LONG TERM CURRENT USE OF ANTICOAGULANT THERAPY: Primary | ICD-10-CM

## 2017-06-19 PROCEDURE — 99207 ZZC CDG-CORRECTLY CODED, REVIEWED AND AGREE: CPT | Performed by: NURSE PRACTITIONER

## 2017-06-19 PROCEDURE — 99308 SBSQ NF CARE LOW MDM 20: CPT | Performed by: NURSE PRACTITIONER

## 2017-06-19 NOTE — PATIENT INSTRUCTIONS
Orders:  1.  INR:  3.8  Coumadin 7.5 mg Tuesday and 5 mg po all other days.  2.  Recheck INR on 6/27/17-for stroke and A-fib.

## 2017-06-19 NOTE — PROGRESS NOTES
[place] [facility]    HPI:    Tonio Francis is a 84 year old  (8/4/1932), who is being seen today for an episodic care visit at [facility].   HPI information obtained from: facility chart records and facility staff. Today's concern is INR/Coumadin management for A. Fib    Bleeding Signs/Symptoms:  None  Thromboembolic Signs/Symptoms:  None    Medication Changes:  No  Dietary Changes:  No  Activity Changes: No  Bacterial/Viral Infection:  No    Missed Coumadin Doses:  None    On ASA: Yes - 81 mg po q day    Other Concerns:  No    OBJECTIVE:    INR Today:  3.8  Current Dose:  Held one day, 7.5mg T/F, 5mg all other days    ASSESSMENT:  Paroxysmal atrial fibrillation (H)  Long term current use of anticoagulant therapy  Encounter for therapeutic drug monitoring  Ischemic embolic stroke (H)    Supratherapeutic INR for goal of 2-3 however coming down from 4.2    PLAN:    New Dose:Coumadin 7.5 mg po Tuesday and 5 mg po all other days..      Next INR: 6/27/17      HANANE Gaspar CNP

## 2017-06-30 ENCOUNTER — NURSING HOME VISIT (OUTPATIENT)
Dept: GERIATRICS | Facility: CLINIC | Age: 82
End: 2017-06-30
Payer: COMMERCIAL

## 2017-06-30 VITALS
BODY MASS INDEX: 26.61 KG/M2 | RESPIRATION RATE: 19 BRPM | TEMPERATURE: 98.1 F | WEIGHT: 180.2 LBS | HEART RATE: 77 BPM | SYSTOLIC BLOOD PRESSURE: 103 MMHG | OXYGEN SATURATION: 94 % | DIASTOLIC BLOOD PRESSURE: 51 MMHG

## 2017-06-30 DIAGNOSIS — Z51.81 ENCOUNTER FOR THERAPEUTIC DRUG MONITORING: ICD-10-CM

## 2017-06-30 DIAGNOSIS — I63.9 ISCHEMIC EMBOLIC STROKE (H): ICD-10-CM

## 2017-06-30 DIAGNOSIS — I48.0 PAROXYSMAL ATRIAL FIBRILLATION (H): Primary | ICD-10-CM

## 2017-06-30 DIAGNOSIS — Z79.01 LONG TERM CURRENT USE OF ANTICOAGULANT THERAPY: ICD-10-CM

## 2017-06-30 PROCEDURE — 99308 SBSQ NF CARE LOW MDM 20: CPT | Performed by: NURSE PRACTITIONER

## 2017-06-30 NOTE — PROGRESS NOTES
[place] [facility]    HPI:    Tonio Francis is a 84 year old  (8/4/1932), who is being seen today for an episodic care visit at [facility] [place].   HPI information obtained from: facility staff and [facility] chart review. Today's concern is INR/Coumadin management for A. Fib    Bleeding Signs/Symptoms:  None  Thromboembolic Signs/Symptoms:  None    Medication Changes:  No  Dietary Changes:  No  Activity Changes: No  Bacterial/Viral Infection:  No    Missed Coumadin Doses:  None    On ASA: Yes - 81 mg po q day    Other Concerns:  No    OBJECTIVE:    INR Today:  2.4  Current Dose:  Coumadin 7.5 mg po Tuesday and 5 mg po all other days    ASSESSMENT:  Paroxysmal atrial fibrillation (H)  Long term current use of anticoagulant therapy  Encounter for therapeutic drug monitoring  Ischemic embolic stroke (H)      Therapeutic INR for goal of 2-3    PLAN:    New Dose: No Change      Next INR: 1 week    HANANE Gaspar CNP

## 2017-07-07 ENCOUNTER — NURSING HOME VISIT (OUTPATIENT)
Dept: GERIATRICS | Facility: CLINIC | Age: 82
End: 2017-07-07
Payer: COMMERCIAL

## 2017-07-07 DIAGNOSIS — Z51.81 ENCOUNTER FOR THERAPEUTIC DRUG MONITORING: ICD-10-CM

## 2017-07-07 DIAGNOSIS — I63.9 ISCHEMIC EMBOLIC STROKE (H): ICD-10-CM

## 2017-07-07 DIAGNOSIS — Z79.01 LONG TERM CURRENT USE OF ANTICOAGULANT THERAPY: Primary | ICD-10-CM

## 2017-07-07 DIAGNOSIS — I48.0 PAROXYSMAL ATRIAL FIBRILLATION (H): ICD-10-CM

## 2017-07-07 PROCEDURE — 99207 ZZC CDG-CORRECTLY CODED, REVIEWED AND AGREE: CPT | Performed by: NURSE PRACTITIONER

## 2017-07-07 PROCEDURE — 99308 SBSQ NF CARE LOW MDM 20: CPT | Performed by: NURSE PRACTITIONER

## 2017-07-07 NOTE — PROGRESS NOTES
[place] [facility]    HPI:    Tonio Francis is a 84 year old  (8/4/1932), who is being seen today for an episodic care visit at [facility].   HPI information obtained from: facility chart records and facility staff. Today's concern is INR/Coumadin management for A. Fib    Bleeding Signs/Symptoms:  None  Thromboembolic Signs/Symptoms:  None    Medication Changes:  No  Dietary Changes:  No  Activity Changes: No  Bacterial/Viral Infection:  No    Missed Coumadin Doses:  None    On ASA: Yes - 81 mg po q day    Other Concerns:  No    OBJECTIVE:    INR Today:  2.4  Current Dose:  5mg all days but Tuesday, 7.5 mg on Tuesdays    ASSESSMENT:  Paroxysmal atrial fibrillation (H)  Ischemic embolic stroke  Long term current use of anticoagulant therapy  Encounter for therapeutic drug monitoring      Therapeutic INR for goal of 2-3    PLAN:    New Dose: No Change      Next INR: 2 weeks        HANANE Gaspar CNP

## 2017-07-08 ENCOUNTER — TELEPHONE (OUTPATIENT)
Dept: GERIATRICS | Facility: CLINIC | Age: 82
End: 2017-07-08

## 2017-07-08 NOTE — TELEPHONE ENCOUNTER
Patient with what sounds like subconjunctival hemorrhage left eye. No change in vision, no pain, no injury. On Coumadin - INR today 2.1    PLAN  Monitor. OK to use cool compress left eye for comfort. Update if any changes.     Electronically signed by HANANE Angelo, GNP

## 2017-08-01 ENCOUNTER — NURSING HOME VISIT (OUTPATIENT)
Dept: GERIATRICS | Facility: CLINIC | Age: 82
End: 2017-08-01
Payer: COMMERCIAL

## 2017-08-01 DIAGNOSIS — Z79.01 LONG TERM CURRENT USE OF ANTICOAGULANT THERAPY: ICD-10-CM

## 2017-08-01 DIAGNOSIS — I63.9 ISCHEMIC EMBOLIC STROKE (H): Primary | ICD-10-CM

## 2017-08-01 DIAGNOSIS — I48.0 PAROXYSMAL ATRIAL FIBRILLATION (H): ICD-10-CM

## 2017-08-01 DIAGNOSIS — Z51.81 ENCOUNTER FOR THERAPEUTIC DRUG MONITORING: ICD-10-CM

## 2017-08-01 PROCEDURE — 99308 SBSQ NF CARE LOW MDM 20: CPT | Performed by: NURSE PRACTITIONER

## 2017-08-01 PROCEDURE — 99207 ZZC CDG-CORRECTLY CODED, REVIEWED AND AGREE: CPT | Performed by: NURSE PRACTITIONER

## 2017-08-01 NOTE — PROGRESS NOTES
[place] [facility]    HPI:    Tonio Francis is a 84 year old  (8/4/1932), who is being seen today for an episodic care visit at [facility] [place].   HPI information obtained from: facility staff and patient report. Today's concern is INR/Coumadin management for A. Fib, CVA    Bleeding Signs/Symptoms:  Apparent bleeding from penis today.  Mixed with urine  Thromboembolic Signs/Symptoms:  None    Medication Changes:  No  Dietary Changes:  No  Activity Changes: No  Bacterial/Viral Infection:  No    Missed Coumadin Doses:  None    On ASA: Yes - 81 mg po q day    Other Concerns:  No    OBJECTIVE:    INR Today:  1.1  Current Dose:  7.5mg Tue and 5mg all other days    Patient denies pain, urgency with urination.  No pain with palpation of abdomen.  Afebrile.  No mental changes    ASSESSMENT:  Ischemic embolic stroke (H)  Paroxysmal atrial fibrillation (H)  Long term current use of anticoagulant therapy  Encounter for therapeutic drug monitoring  Due to potential bleeding will not adjust dose today, but will check both bleeding and INR on Friday.  Nursing staff to continue to monitor and report bleeding or other S/S    Subtherapeutic INR for goal of 2-3    PLAN:    New Dose: No Change    Nursing staff to monitor bleeding and s/s complications/infection    Next INR: 8/4/17        HANANE Gaspar CNP

## 2017-08-04 ENCOUNTER — DISCHARGE SUMMARY NURSING HOME (OUTPATIENT)
Dept: GERIATRICS | Facility: CLINIC | Age: 82
End: 2017-08-04
Payer: COMMERCIAL

## 2017-08-04 VITALS
RESPIRATION RATE: 16 BRPM | WEIGHT: 184.4 LBS | OXYGEN SATURATION: 97 % | TEMPERATURE: 97 F | BODY MASS INDEX: 27.23 KG/M2 | HEART RATE: 75 BPM | DIASTOLIC BLOOD PRESSURE: 70 MMHG | SYSTOLIC BLOOD PRESSURE: 128 MMHG

## 2017-08-04 DIAGNOSIS — R13.10 DYSPHAGIA, UNSPECIFIED TYPE: ICD-10-CM

## 2017-08-04 DIAGNOSIS — I69.959 HEMIPLEGIA OF NONDOMINANT SIDE AS LATE EFFECT OF CEREBROVASCULAR DISEASE (H): Primary | ICD-10-CM

## 2017-08-04 DIAGNOSIS — F43.21 ADJUSTMENT DISORDER WITH DEPRESSED MOOD: ICD-10-CM

## 2017-08-04 DIAGNOSIS — I48.0 PAROXYSMAL ATRIAL FIBRILLATION (H): ICD-10-CM

## 2017-08-04 DIAGNOSIS — Z93.1 G TUBE FEEDINGS (H): ICD-10-CM

## 2017-08-04 DIAGNOSIS — R91.8 PULMONARY NODULES: ICD-10-CM

## 2017-08-04 PROCEDURE — 99315 NF DSCHRG MGMT 30 MIN/LESS: CPT | Performed by: NURSE PRACTITIONER

## 2017-08-04 PROCEDURE — 99207 ZZC CDG-CORRECTLY CODED, REVIEWED AND AGREE: CPT | Performed by: NURSE PRACTITIONER

## 2017-08-04 RX ORDER — SENNOSIDES 8.6 MG
2 TABLET ORAL DAILY PRN
COMMUNITY

## 2017-08-04 NOTE — PROGRESS NOTES
[place] [facility] DISCHARGE SUMMARY    PATIENT'S NAME: Tonio Francis  YOB: 1932  MEDICAL RECORD NUMBER:  [id number]    PRIMARY CARE PROVIDER AND CLINIC RESPONSIBLE AFTER TRANSFER: [facility] [place] [facility] [address] / [place] [address]     CODE STATUS/ADVANCE DIRECTIVES DISCUSSION:   DNR/DNI       Allergies   Allergen Reactions     Novocain [Procaine]      Other reaction(s): Dizziness  Lightheadedness    (Novocaine)       TRANSFERRING PROVIDERS: HANANE Gaspar CNP,   DATE OF SNF ADMISSION:  March / 08 / 2017  DATE OF SNF (anticipated) DISCHARGE: August / 07 / 2017  DISCHARGE DISPOSITION: [place], [place]   Nursing Facility: [facility] stay 3/6/17 to 3/8/17.     Condition on Discharge:  Stable.  Function:  Requires assist with all ADLs, left sided weakness  Cognitive Scores: alert, oriented, slow to respond    Equipment: wheelchair and hospital bed with overlay    DISCHARGE DIAGNOSIS:   1. Hemiplegia of nondominant side as late effect of cerebrovascular disease (H)    2. Dysphagia, unspecified type    3. G tube feedings (H)    4. Paroxysmal atrial fibrillation (H)    5. Adjustment disorder with depressed mood    6. Pulmonary nodules        HPI Nursing Facility Course:  HPI information obtained from: facility chart records, facility staff, patient report and [facility] chart review.  Hemiplegia of nondominant side as late effect of cerebrovascular disease (H)  Left sided weakness, chronic, requires assist with all ADLs    Dysphagia, unspecified type  Is eating orally now without difficulty    G tube feedings (H)  No longer receiving feedings via G Tube.  Has expressed interest in having it removed.  IF no longer used, removal can be considered.    Paroxysmal atrial fibrillation (H)  No concerns.  Denies chest pain, palpitations, racing.  Anticoagulation with Coumadin.  Missed about 10 doses past  2 weeks.  Most recent INR 1.1.  Also has had small amt blood in urine x 3 days.  No c/o pain, burning, not worsening    Adjustment disorder with depressed mood  Managed well with meds.  No c/o    Pulmonary nodules  Discovered in scan.  No plan to pursue further testing at this time.  Patient not aware of any ramifications.  Denies SOB, cough      PAST MEDICAL HISTORY:  has a past medical history of Cerebral infarction (H); Depressive disorder; and Hypertension.    DISCHARGE MEDICATIONS:  Current Outpatient Prescriptions   Medication Sig Dispense Refill     sennosides (SENOKOT) 8.6 MG tablet Take 2 tablets by mouth daily as needed for constipation       Warfarin Sodium (COUMADIN PO) Take by mouth daily Take as Directed Per INR Results       OMEPRAZOLE PO Take 20 mg by mouth every morning       loperamide (IMODIUM) 2 MG capsule Take 2 mg by mouth 4 times daily as needed for diarrhea And 2mg daily       artificial tears OINT ophthalmic ointment Place into both eyes At Bedtime       ASPIRIN PO Take 81 mg by mouth daily       FLUOXETINE HCL PO Take 20 mg by mouth daily        METOPROLOL SUCCINATE ER PO 25 mg by Gastric Tube route 2 times daily        ROSUVASTATIN CALCIUM PO Take 20 mg by mouth daily       HYDROcodone-acetaminophen (NORCO) 5-325 MG per tablet Take 1 tablet by mouth 3 times daily        Acetaminophen (TYLENOL PO) Take 650 mg by mouth every 4 hours as needed for mild pain or fever       Nutritional Supplements (NUTRITIONAL SUPPLEMENT PO) 2 calorie supplement.  240cc two times a day           Controlled medications sent with patient: Script for Norco 5-325mg medication for 90 tabs and 0 refills given to patient at dischage to have them fill at their out patient pharmacy     ROS:    4 point ROS including Respiratory, CV, GI and , other than that noted in the HPI,  is negative    Physical Exam:   Vitals: /70  Pulse 75  Temp 97  F (36.1  C)  Resp 16  Wt 184 lb 6.4 oz (83.6 kg)  SpO2 97%  BMI 27.23  kg/m2  BMI= Body mass index is 27.23 kg/(m^2).    GENERAL APPEARANCE:  Alert, in no distress, oriented  ENT:  Mouth and posterior oropharynx normal, moist mucous membranes  EYES:  EOM, conjunctivae, lids, pupils and irises normal  NECK:  No adenopathy,masses or thyromegaly  RESP:  lungs clear to auscultation , no respiratory distress  CV:  regular rate and rhythm, no murmur, rub, or gallop, no edema  ABDOMEN:  normal bowel sounds, soft, nontender, no hepatosplenomegaly or other masses  :    no blood noted in brief today  M/S:   Gait and station abnormal w/c  SKIN:  Inspection of skin and subcutaneous tissue baseline  NEURO:   left sided weakness  PSYCH:  oriented X 3, slow to respond but responses appropriate    DISCHARGE PLAN:  long term with services  Patient instructed to follow-up with:  PCP in 1-2 weeks establish care with new PCP       [facility] scheduled appointments:  No future appointments.    MTM referral needed and placed by this provider: No    Pending labs: INR  SNF labs   Results for orders placed or performed in visit on 05/18/17   Basic metabolic panel   Result Value Ref Range    Sodium 144 133 - 144 mmol/L    Potassium 3.6 3.4 - 5.3 mmol/L    Chloride 107 94 - 109 mmol/L    Carbon Dioxide 29 20 - 32 mmol/L    Anion Gap 8 3 - 14 mmol/L    Glucose 85 70 - 99 mg/dL    Urea Nitrogen 9 7 - 30 mg/dL    Creatinine 0.70 0.66 - 1.25 mg/dL    GFR Estimate >90  Non  GFR Calc   >60 mL/min/1.7m2    GFR Estimate If Black >90   GFR Calc   >60 mL/min/1.7m2    Calcium 8.9 8.5 - 10.1 mg/dL   CBC with platelets   Result Value Ref Range    WBC 5.8 4.0 - 11.0 10e9/L    RBC Count 4.16 (L) 4.4 - 5.9 10e12/L    Hemoglobin 12.9 (L) 13.3 - 17.7 g/dL    Hematocrit 39.9 (L) 40.0 - 53.0 %    MCV 96 78 - 100 fl    MCH 31.0 26.5 - 33.0 pg    MCHC 32.3 31.5 - 36.5 g/dL    RDW 14.1 10.0 - 15.0 %    Platelet Count 223 150 - 450 10e9/L       Discharge Treatments:none    TOTAL DISCHARGE TIME:   Less  than or equal to 30 minutes  Electronically signed by:  HANANE Gaspar CNP         Face to Face and Medical Necessity Statement for DME Provider visit    Demographic Information on Tonio Francis:  Gender: male  : 1932  [id number] [address] [facility] [address]  None (home)     Medical Record: [id number]  Social Security Number: [id number]  Primary Care Provider: [facility]  Insurance: Payor: BCBS / Plan: BCBS PLATINUM BLUE / Product Type: PPO /     HPI:   Tonio Francis is a 85 year old  (1932), who is being seen today for a face to face provider visit at [facility]; medical necessity statement for DME included. This patient requires the following:  DME ordered:  Hospital bed: fully electronic, Overlay for hospital bed, incontinence briefs       Medical Necessity Statement   Pt needing above DME with expected length of need of 99 years    due to medical necessity associated with following diagnosis:     Hemiplegia of nondominant side as late effect of cerebrovascular disease (H)  Dysphagia, unspecified type  G tube feedings (H)  Paroxysmal atrial fibrillation (H)  Adjustment disorder with depressed mood  Pulmonary nodules      PM   has a past medical history of Cerebral infarction (H); Depressive disorder; and Hypertension.  CURRENT MEDICATIONS  Current Outpatient Prescriptions   Medication Sig Dispense Refill     sennosides (SENOKOT) 8.6 MG tablet Take 2 tablets by mouth daily as needed for constipation       Warfarin Sodium (COUMADIN PO) Take by mouth daily Take as Directed Per INR Results       OMEPRAZOLE PO Take 20 mg by mouth every morning       loperamide (IMODIUM) 2 MG capsule Take 2 mg by mouth 4 times daily as needed for diarrhea And 2mg daily       artificial tears OINT ophthalmic ointment Place into both eyes At Bedtime       ASPIRIN PO Take 81 mg by mouth daily       FLUOXETINE HCL PO Take 20 mg by mouth daily        METOPROLOL SUCCINATE ER PO 25 mg by Gastric Tube  route 2 times daily        ROSUVASTATIN CALCIUM PO Take 20 mg by mouth daily       HYDROcodone-acetaminophen (NORCO) 5-325 MG per tablet Take 1 tablet by mouth 3 times daily        Acetaminophen (TYLENOL PO) Take 650 mg by mouth every 4 hours as needed for mild pain or fever       Nutritional Supplements (NUTRITIONAL SUPPLEMENT PO) 2 calorie supplement.  240cc two times a day       ROS:4 point ROS including Respiratory, CV, GI and , other than that noted in the HPI,  is negative     EXAM  Vitals: /70  Pulse 75  Temp 97  F (36.1  C)  Resp 16  Wt 184 lb 6.4 oz (83.6 kg)  SpO2 97%  BMI 27.23 kg/m2;BMI= Body mass index is 27.23 kg/(m^2).see above      ASSESSMENT/PLAN:  1. Hemiplegia of nondominant side as late effect of cerebrovascular disease (H)    2. Dysphagia, unspecified type    3. G tube feedings (H)    4. Paroxysmal atrial fibrillation (H)    5. Adjustment disorder with depressed mood    6. Pulmonary nodules        Orders:  1. Facility staff/TC to contact DME company to get their order form for provider to fill out    ELECTRONICALLY SIGNED BY BOB CERTIFIED PROVIDER:  HANANE Gaspar CNP   NPI: [id number]  [place] [facility]  [facility] [address], [address]  [place] [address]

## 2017-08-08 ENCOUNTER — NURSING HOME VISIT (OUTPATIENT)
Dept: GERIATRICS | Facility: CLINIC | Age: 82
End: 2017-08-08
Payer: COMMERCIAL

## 2017-08-08 DIAGNOSIS — I48.0 PAROXYSMAL ATRIAL FIBRILLATION (H): ICD-10-CM

## 2017-08-08 DIAGNOSIS — Z51.81 ENCOUNTER FOR THERAPEUTIC DRUG MONITORING: ICD-10-CM

## 2017-08-08 DIAGNOSIS — Z79.01 LONG TERM CURRENT USE OF ANTICOAGULANT THERAPY: ICD-10-CM

## 2017-08-08 DIAGNOSIS — I69.959 HEMIPLEGIA OF NONDOMINANT SIDE AS LATE EFFECT OF CEREBROVASCULAR DISEASE (H): Primary | ICD-10-CM

## 2017-08-08 PROCEDURE — 99308 SBSQ NF CARE LOW MDM 20: CPT | Performed by: NURSE PRACTITIONER

## 2017-08-08 NOTE — PROGRESS NOTES
[place] [facility]    HPI:    Tonio Francis is a 85 year old  (8/4/1932), who is being seen today for an episodic care visit at [facility] [place].   HPI information obtained from: facility chart records, facility staff and patient report. Today's concern is INR/Coumadin management for A. Fib and CVA    Bleeding Signs/Symptoms:  None  Thromboembolic Signs/Symptoms:  None    Medication Changes:  No  Dietary Changes:  No  Activity Changes: No  Bacterial/Viral Infection:  No    Missed Coumadin Doses:  None    On ASA: Yes - 81 mg po q day    Other Concerns:  No    OBJECTIVE:    INR Today:  1.2  Current Dose:  5mg daily    ASSESSMENT:  Hemiplegia of nondominant side as late effect of cerebrovascular disease (H)  Paroxysmal atrial fibrillation (H)  Long term current use of anticoagulant therapy  Encounter for therapeutic drug monitoring      Subtherapeutic INR for goal of 2-3    PLAN:    New Dose: 7.5mg Tuesday, 5mg all other days      Next INR: 8/15/17        HANANE Gaspar CNP

## 2017-08-15 ENCOUNTER — NURSING HOME VISIT (OUTPATIENT)
Dept: GERIATRICS | Facility: CLINIC | Age: 82
End: 2017-08-15
Payer: COMMERCIAL

## 2017-08-15 DIAGNOSIS — Z51.81 ENCOUNTER FOR THERAPEUTIC DRUG MONITORING: ICD-10-CM

## 2017-08-15 DIAGNOSIS — I48.0 PAROXYSMAL ATRIAL FIBRILLATION (H): ICD-10-CM

## 2017-08-15 DIAGNOSIS — Z79.01 LONG TERM CURRENT USE OF ANTICOAGULANT THERAPY: ICD-10-CM

## 2017-08-15 DIAGNOSIS — I69.959 HEMIPLEGIA OF NONDOMINANT SIDE AS LATE EFFECT OF CEREBROVASCULAR DISEASE (H): Primary | ICD-10-CM

## 2017-08-15 PROCEDURE — 99308 SBSQ NF CARE LOW MDM 20: CPT | Performed by: NURSE PRACTITIONER

## 2017-08-15 NOTE — PROGRESS NOTES
[place] [facility]    HPI:    Tonio Francis is a 85 year old  (8/4/1932), who is being seen today for an episodic care visit at [facility] [place].   HPI information obtained from: facility chart records, facility staff and patient report. Today's concern is INR/Coumadin management for A. Fib and CVA    Bleeding Signs/Symptoms:  None  Thromboembolic Signs/Symptoms:  None    Medication Changes:  No  Dietary Changes:  No  Activity Changes: No  Bacterial/Viral Infection:  No    Missed Coumadin Doses:  None    On ASA: Yes - 81 mg po q day    Other Concerns:  No    OBJECTIVE:    INR Today:  1.5  Current Dose:  7.5mg Tuesday and 5mg all other days    ASSESSMENT:  Hemiplegia of nondominant side as late effect of cerebrovascular disease (H)  Paroxysmal atrial fibrillation (H)  Long term current use of anticoagulant therapy  Encounter for therapeutic drug monitoring    Subtherapeutic INR for goal of 2-3    PLAN:    New Dose: 7.5mg daily      Next INR: 8/22/17        HANANE Gaspar CNP

## 2017-08-22 ENCOUNTER — NURSING HOME VISIT (OUTPATIENT)
Dept: GERIATRICS | Facility: CLINIC | Age: 82
End: 2017-08-22
Payer: COMMERCIAL

## 2017-08-22 DIAGNOSIS — I69.959 HEMIPLEGIA OF NONDOMINANT SIDE AS LATE EFFECT OF CEREBROVASCULAR DISEASE (H): Primary | ICD-10-CM

## 2017-08-22 DIAGNOSIS — Z51.81 ENCOUNTER FOR THERAPEUTIC DRUG MONITORING: ICD-10-CM

## 2017-08-22 DIAGNOSIS — Z79.01 LONG TERM CURRENT USE OF ANTICOAGULANT THERAPY: ICD-10-CM

## 2017-08-22 DIAGNOSIS — I48.0 PAROXYSMAL ATRIAL FIBRILLATION (H): ICD-10-CM

## 2017-08-22 PROCEDURE — 99308 SBSQ NF CARE LOW MDM 20: CPT | Performed by: NURSE PRACTITIONER

## 2017-08-22 NOTE — PROGRESS NOTES
[place] [facility]    HPI:    Tonio Francis is a 85 year old  (8/4/1932), who is being seen today for an episodic care visit at [facility] [place].   HPI information obtained from: facility chart records and facility staff. Today's concern is INR/Coumadin management for A. Fib and CVA    Bleeding Signs/Symptoms:  None  Thromboembolic Signs/Symptoms:  None    Medication Changes:  No  Dietary Changes:  No  Activity Changes: No  Bacterial/Viral Infection:  No    Missed Coumadin Doses:  None    On ASA: Yes - 81 mg po q day    Other Concerns:  No    OBJECTIVE:    INR Today:  3.0  Current Dose:  7.5mg daily    ASSESSMENT:  Hemiplegia of nondominant side as late effect of cerebrovascular disease (H)  Paroxysmal atrial fibrillation (H)  Long term current use of anticoagulant therapy  Encounter for therapeutic drug monitoring    Therapeutic INR for goal of 2-3 although upper limit and rapid rise.  Will decrease dose slightly    PLAN:    New Dose: 7.5mg Mon, Wed, Fri, Sun and 5mg Tue, Thur, Sat       Next INR: 8/29/17        HANANE Gaspar CNP

## 2017-08-29 ENCOUNTER — NURSING HOME VISIT (OUTPATIENT)
Dept: GERIATRICS | Facility: CLINIC | Age: 82
End: 2017-08-29
Payer: COMMERCIAL

## 2017-08-29 DIAGNOSIS — Z51.81 ENCOUNTER FOR THERAPEUTIC DRUG MONITORING: ICD-10-CM

## 2017-08-29 DIAGNOSIS — I63.411 CEREBRAL INFARCTION DUE TO EMBOLISM OF RIGHT MIDDLE CEREBRAL ARTERY (H): Primary | ICD-10-CM

## 2017-08-29 DIAGNOSIS — I48.0 PAROXYSMAL ATRIAL FIBRILLATION (H): ICD-10-CM

## 2017-08-29 DIAGNOSIS — Z79.01 LONG TERM CURRENT USE OF ANTICOAGULANT THERAPY: ICD-10-CM

## 2017-08-29 PROCEDURE — 99308 SBSQ NF CARE LOW MDM 20: CPT | Performed by: NURSE PRACTITIONER

## 2017-08-29 NOTE — PROGRESS NOTES
[place] [facility]    HPI:    Tonio Francis is a 85 year old  (8/4/1932), who is being seen today for an episodic care visit at The [facility] [place].   HPI information obtained from: facility chart records, facility staff and patient report. Today's concern is INR/Coumadin management for A. 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

## 2017-09-05 ENCOUNTER — NURSING HOME VISIT (OUTPATIENT)
Dept: GERIATRICS | Facility: CLINIC | Age: 82
End: 2017-09-05
Payer: COMMERCIAL

## 2017-09-05 DIAGNOSIS — Z51.81 ENCOUNTER FOR THERAPEUTIC DRUG MONITORING: ICD-10-CM

## 2017-09-05 DIAGNOSIS — I63.411 CEREBRAL INFARCTION DUE TO EMBOLISM OF RIGHT MIDDLE CEREBRAL ARTERY (H): Primary | ICD-10-CM

## 2017-09-05 DIAGNOSIS — Z79.01 LONG TERM CURRENT USE OF ANTICOAGULANT THERAPY: ICD-10-CM

## 2017-09-05 DIAGNOSIS — I48.0 PAROXYSMAL ATRIAL FIBRILLATION (H): ICD-10-CM

## 2017-09-05 PROCEDURE — 99308 SBSQ NF CARE LOW MDM 20: CPT | Performed by: NURSE PRACTITIONER

## 2017-09-05 NOTE — PROGRESS NOTES
[place] [facility]    HPI:    Tonio Francis is a 85 year old  (8/4/1932), who is being seen today for an episodic care visit at [facility] [place].   HPI information obtained from: facility chart records, facility staff and patient report. Today's concern is INR/Coumadin management for A. 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

## 2017-09-12 ENCOUNTER — TELEPHONE (OUTPATIENT)
Dept: GERIATRICS | Facility: CLINIC | Age: 82
End: 2017-09-12

## 2017-09-12 ENCOUNTER — NURSING HOME VISIT (OUTPATIENT)
Dept: GERIATRICS | Facility: CLINIC | Age: 82
End: 2017-09-12
Payer: COMMERCIAL

## 2017-09-12 DIAGNOSIS — I63.411 CEREBRAL INFARCTION DUE TO EMBOLISM OF RIGHT MIDDLE CEREBRAL ARTERY (H): Primary | ICD-10-CM

## 2017-09-12 DIAGNOSIS — Z79.01 LONG TERM CURRENT USE OF ANTICOAGULANT THERAPY: ICD-10-CM

## 2017-09-12 DIAGNOSIS — Z51.81 ENCOUNTER FOR THERAPEUTIC DRUG MONITORING: ICD-10-CM

## 2017-09-12 DIAGNOSIS — I48.0 PAROXYSMAL ATRIAL FIBRILLATION (H): ICD-10-CM

## 2017-09-12 PROCEDURE — 99308 SBSQ NF CARE LOW MDM 20: CPT | Performed by: NURSE PRACTITIONER

## 2017-09-12 NOTE — PROGRESS NOTES
[place] [facility]    HPI:    Tonio Francis is a 85 year old  (8/4/1932), who is being seen today for an episodic care visit at The [facility] [place].   HPI information obtained from: facility chart records, facility staff and patient report. Today's concern is INR/Coumadin management for A. 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

## 2017-09-13 NOTE — TELEPHONE ENCOUNTER
Called by nursing staff at [facility] about patient    Gtube has become dislodged with balloon inflated.   She reports only receiving flushes through tube at this time.    She is comfortable placing Camacho catheter to keep tract open tonight   Follow up with primary team re: replacement vs d/c of Gtube.       Rosa Campbell MD

## 2017-09-19 ENCOUNTER — NURSING HOME VISIT (OUTPATIENT)
Dept: GERIATRICS | Facility: CLINIC | Age: 82
End: 2017-09-19
Payer: COMMERCIAL

## 2017-09-19 VITALS
SYSTOLIC BLOOD PRESSURE: 114 MMHG | RESPIRATION RATE: 16 BRPM | HEART RATE: 70 BPM | OXYGEN SATURATION: 94 % | TEMPERATURE: 95.8 F | WEIGHT: 189.4 LBS | DIASTOLIC BLOOD PRESSURE: 46 MMHG | BODY MASS INDEX: 28.05 KG/M2 | HEIGHT: 69 IN

## 2017-09-19 DIAGNOSIS — Z79.01 LONG TERM CURRENT USE OF ANTICOAGULANT THERAPY: ICD-10-CM

## 2017-09-19 DIAGNOSIS — I69.959 HEMIPLEGIA OF NONDOMINANT SIDE AS LATE EFFECT OF CEREBROVASCULAR DISEASE (H): ICD-10-CM

## 2017-09-19 DIAGNOSIS — I48.0 PAROXYSMAL ATRIAL FIBRILLATION (H): ICD-10-CM

## 2017-09-19 DIAGNOSIS — I63.411 CEREBRAL INFARCTION DUE TO EMBOLISM OF RIGHT MIDDLE CEREBRAL ARTERY (H): Primary | ICD-10-CM

## 2017-09-19 DIAGNOSIS — Z51.81 ENCOUNTER FOR THERAPEUTIC DRUG MONITORING: ICD-10-CM

## 2017-09-19 PROCEDURE — 99310 SBSQ NF CARE HIGH MDM 45: CPT | Performed by: NURSE PRACTITIONER

## 2017-09-19 NOTE — PROGRESS NOTES
"[place] [facility]  Chief Complaint   Patient presents with     Annual Comprehensive Nursing Home       HPI:    Tonio Francis is a 85 year old  (8/4/1932), who is being seen today for an annual comprehensive visit at [facility] [place].  HPI information obtained from: facility chart records, facility staff and patient report.  Today's concerns are:  Cerebral infarction due to embolism of right middle cerebral artery (H)  Paroxysmal atrial fibrillation (H)  Long term current use of anticoagulant therapy  Encounter for therapeutic drug monitoring  Hemiplegia of nondominant side as late effect of cerebrovascular disease (H)  Patient condition has been stable, no major changes. Has had one fall--while turning self in bed, fell out of bed.  Bed was in low position, sustained no injury.  Patient has left sided weakness/hemiplegia.  Requires full assist with ADLs.  Cognitively alert and oriented, able to make needs known.  Shares room with wife who's dementia is progressing.  This is distressing at times to patient.  Has offered suggestions to care team re:  Ways of managing her.  States his stress is less now since those were implemented.  INRs have been stable, no chest pain, dizziness, palpitations, bleeding  GTube in place.  Patient states he is ok to leave it there \"in case I need it in the future\".  Currently takes all nutrition orally.   Patient does not have hypertension or depression    Based on JNC-8 goals,  patients age of 85 year old, no presence of diabetes or CKD, and goals of care goal BP is  <140/90 mm Hg. patient is stable and continue without pharmacological invention with routine assessment.    Depression screen done: PHQ-2 Given screen score and clinical assessment patient is stable without any signs of depression and no futher interventions warrented at this time.      ALLERGIES: Novocain [procaine]  PROBLEM LIST:  Patient Active Problem List   Diagnosis     Ischemic embolic stroke (H) "     Upper airway resistance syndrome     Dysphagia     Hemiplegia of nondominant side as late effect of cerebrovascular disease (H)     Hyperlipidemia     Nausea and vomiting     Paroxysmal atrial fibrillation (H)     Pure hypercholesterolemia     Systemic infection (H)     Advanced directives, counseling/discussion     Non-intractable vomiting with nausea, unspecified vomiting type     G tube feedings (H)     Adjustment disorder with depressed mood     Long term current use of anticoagulant therapy     Encounter for therapeutic drug monitoring     PAST MEDICAL HISTORY:  has a past medical history of Cerebral infarction (H); Depressive disorder; and Hypertension.  PAST SURGICAL HISTORY:  has no past surgical history on file.  FAMILY HISTORY: Family history is unknown by patient.  SOCIAL HISTORY:  reports that he does not drink alcohol.  IMMUNIZATIONS:    There is no immunization history on file for this patient.  Above immunizations pulled from [employer]. [facility] and facility records also reconciled. Outstanding information sent to  to update [employer]   Future immunizations needed:  yearly influenza per facility protocol  MEDICATIONS:  Current Outpatient Prescriptions   Medication Sig Dispense Refill     sennosides (SENOKOT) 8.6 MG tablet Take 2 tablets by mouth daily as needed for constipation       Warfarin Sodium (COUMADIN PO) 7.5mg M, W, F and Sun, and 5mg on Tu, Th, Sat       OMEPRAZOLE PO Take 20 mg by mouth every morning       loperamide (IMODIUM) 2 MG capsule Take 2 mg by mouth 4 times daily as needed for diarrhea And 2mg daily       Nutritional Supplements (NUTRITIONAL SUPPLEMENT PO) 2 calorie supplement.  240cc two times a day       artificial tears OINT ophthalmic ointment Place into both eyes At Bedtime       ASPIRIN PO Take 81 mg by mouth daily       FLUOXETINE HCL PO Take 20 mg by mouth daily        METOPROLOL SUCCINATE ER PO 25 mg by Gastric Tube route 2 times daily         ROSUVASTATIN CALCIUM PO Take 20 mg by mouth daily       HYDROcodone-acetaminophen (NORCO) 5-325 MG per tablet Take 1 tablet by mouth 3 times daily        Acetaminophen (TYLENOL PO) Take 650 mg by mouth every 4 hours as needed for mild pain or fever       Medications reviewed:  Medications reconciled to facility chart and changes were made to reflect current medications as identified as above med list. Below are the changes that were made:   Medications stopped since last EPIC medication reconciliation:   There are no discontinued medications.    Medications started since last [facility] medication reconciliation:  No orders of the defined types were placed in this encounter.        Case Management:  I have reviewed the facility/SNF care plan/MDS which was done june 2017, including the falls risk, nutrition and pain screening. I also reviewed the current immunizations, and preventive care..Future cancer screening is not clinically indicated secondary to age/goals of care Patient's desire to return to the community is no present.  planning to move to [facility] facility closer to family. Current Level of Care is appropriate.    Advance Directive Discussion:    I reviewed the current advanced directives as reflected in EPIC, the POLST and the facility chart, and verified the congruency of orders DNR/DNI. I contacted the first party daughter and left a message regarding the plan of Care.  I did review the advance directives with the resident.     Team Discussion:  I communicated with the appropriate disciplines involved with the Plan of Care:   Nursing:  Marie and :  Kecia    Patient Goal:  Patient's goal is continue to live with wife as long as possible.    Information reviewed:  Medications, vital signs, orders, and nursing notes.    ROS:  10 point ROS of systems including Constitutional, Eyes, Respiratory, Cardiovascular, Gastroenterology, Genitourinary, Integumentary, Muscularskeletal, Psychiatric were all negative  "except for pertinent positives noted in my HPI.    Exam:  /46  Pulse 70  Temp 95.8  F (35.4  C)  Resp 16  Ht 5' 9\" (1.753 m)  Wt 189 lb 6.4 oz (85.9 kg)  SpO2 94%  BMI 27.97 kg/m2    GENERAL APPEARANCE:  Alert, in no distress, oriented  ENT:  Mouth and posterior oropharynx normal, moist mucous membranes, edentulous  with denture plateboth  EYES:  EOM, conjunctivae, lids, pupils and irises normal, glasses.  no vision in right eye due to previous accident  NECK:  No adenopathy,masses or thyromegaly  RESP:  lungs clear to auscultation , no respiratory distress  CV:  regular rate and rhythm, no murmur, rub, or gallop, no edema  ABDOMEN:  normal bowel sounds, soft, nontender, no hepatosplenomegaly or other masses  M/S:   Gait and station abnormal w/c. extremely limited use left U/E and L/E  SKIN:  Inspection of skin and subcutaneous tissue baseline  NEURO:   no functional use left U/E left L/E strength 2/5  PSYCH:  oriented X 3, normal insight, judgement and memory, response/speech delays     Lab/Diagnostic data:    Hospital Laboratory on 05/18/2017   Component Date Value Ref Range Status     Sodium 05/18/2017 144  133 - 144 mmol/L Final     Potassium 05/18/2017 3.6  3.4 - 5.3 mmol/L Final     Chloride 05/18/2017 107  94 - 109 mmol/L Final     Carbon Dioxide 05/18/2017 29  20 - 32 mmol/L Final     Anion Gap 05/18/2017 8  3 - 14 mmol/L Final     Glucose 05/18/2017 85  70 - 99 mg/dL Final     Urea Nitrogen 05/18/2017 9  7 - 30 mg/dL Final     Creatinine 05/18/2017 0.70  0.66 - 1.25 mg/dL Final     GFR Estimate 05/18/2017   >60 mL/min/1.7m2 Final                    Value:>90  Non  GFR Calc       GFR Estimate If Black 05/18/2017   >60 mL/min/1.7m2 Final                    Value:>90   GFR Calc       Calcium 05/18/2017 8.9  8.5 - 10.1 mg/dL Final     WBC 05/18/2017 5.8  4.0 - 11.0 10e9/L Final     RBC Count 05/18/2017 4.16* 4.4 - 5.9 10e12/L Final     Hemoglobin 05/18/2017 12.9* " 13.3 - 17.7 g/dL Final     Hematocrit 05/18/2017 39.9* 40.0 - 53.0 % Final     MCV 05/18/2017 96  78 - 100 fl Final     MCH 05/18/2017 31.0  26.5 - 33.0 pg Final     MCHC 05/18/2017 32.3  31.5 - 36.5 g/dL Final     RDW 05/18/2017 14.1  10.0 - 15.0 % Final     Platelet Count 05/18/2017 223  150 - 450 10e9/L Final         ASSESSMENT/PLAN  Cerebral infarction due to embolism of right middle cerebral artery (H)  Paroxysmal atrial fibrillation (H)  Long term current use of anticoagulant therapy  Encounter for therapeutic drug monitoring  Hemiplegia of nondominant side as late effect of cerebrovascular disease (H)  Stable, compensated. The current medical regimen is effective;  continue present plan and medications.    INR/Coumadin management for A. Fib and CVA    Bleeding Signs/Symptoms:  None  Thromboembolic Signs/Symptoms:  None    Medication Changes:  No  Dietary Changes:  No  Activity Changes: No  Bacterial/Viral Infection:  No    Missed Coumadin Doses:  None    On ASA: Yes - 81 mg po q day    Other Concerns:  No    OBJECTIVE:    INR Today:  1.7    ASSESSMENT:  CVA, A fib  Subtherapeutic INR for goal of 2-3    PLAN:    New Dose: see orders      Next INR: 1 week    HANANE Gaspar CNP          Orders:  1.  INR 1.7  2.  New Coumadin dose--5mg Tues, Thurs and 7.5mg all other days.  Dx:  AFIB, Hx CVA  3.  Recheck INR 9/26/17    Electronically signed by:  HANANE Gaspar CNP

## 2017-09-26 ENCOUNTER — NURSING HOME VISIT (OUTPATIENT)
Dept: GERIATRICS | Facility: CLINIC | Age: 82
End: 2017-09-26
Payer: COMMERCIAL

## 2017-09-26 DIAGNOSIS — Z51.81 ENCOUNTER FOR THERAPEUTIC DRUG MONITORING: ICD-10-CM

## 2017-09-26 DIAGNOSIS — I48.0 PAROXYSMAL ATRIAL FIBRILLATION (H): ICD-10-CM

## 2017-09-26 DIAGNOSIS — I63.411 CEREBRAL INFARCTION DUE TO EMBOLISM OF RIGHT MIDDLE CEREBRAL ARTERY (H): Primary | ICD-10-CM

## 2017-09-26 DIAGNOSIS — Z79.01 LONG TERM CURRENT USE OF ANTICOAGULANT THERAPY: ICD-10-CM

## 2017-09-26 PROCEDURE — 99308 SBSQ NF CARE LOW MDM 20: CPT | Performed by: NURSE PRACTITIONER

## 2017-09-26 NOTE — PROGRESS NOTES
[place] [facility]    HPI:    Tonio Francis is a 85 year old  (8/4/1932), who is being seen today for an episodic care visit at [facility] [place].   HPI information obtained from: facility chart records, facility staff and patient report. Today's concern is INR/Coumadin management for A. Fib and Hx of CVA.    Bleeding Signs/Symptoms:  None  Thromboembolic Signs/Symptoms:  None    Medication Changes:  No  Dietary Changes:  No  Activity Changes: No  Bacterial/Viral Infection:  No    Missed Coumadin Doses:  None    On ASA: Yes - 81 mg po q day    Other Concerns:  No    OBJECTIVE:    INR Today:  2.8  Current Dose:  5mg Tu, Thur and 7.5mg all other days    ASSESSMENT:  Cerebral infarction due to embolism of right middle cerebral artery (H)  Paroxysmal atrial fibrillation (H)  Long term current use of anticoagulant therapy  Encounter for therapeutic drug monitoring      Therapeutic INR for goal of 2-3, but came up quickly, will back down a bit on the dosage    PLAN:    New Dose: 5mg Tu, Thu, Sat and 7.5mg all other days      Next INR: 1 week      HANANE Gaspar CNP

## 2017-10-03 ENCOUNTER — NURSING HOME VISIT (OUTPATIENT)
Dept: GERIATRICS | Facility: CLINIC | Age: 82
End: 2017-10-03
Payer: COMMERCIAL

## 2017-10-03 DIAGNOSIS — I63.411 CEREBRAL INFARCTION DUE TO EMBOLISM OF RIGHT MIDDLE CEREBRAL ARTERY (H): Primary | ICD-10-CM

## 2017-10-03 DIAGNOSIS — Z79.01 LONG TERM CURRENT USE OF ANTICOAGULANT THERAPY: ICD-10-CM

## 2017-10-03 DIAGNOSIS — I48.0 PAROXYSMAL ATRIAL FIBRILLATION (H): ICD-10-CM

## 2017-10-03 DIAGNOSIS — Z51.81 ENCOUNTER FOR THERAPEUTIC DRUG MONITORING: ICD-10-CM

## 2017-10-03 PROCEDURE — 99308 SBSQ NF CARE LOW MDM 20: CPT | Performed by: NURSE PRACTITIONER

## 2017-10-03 NOTE — PROGRESS NOTES
[place] [facility]    HPI:    Tonio Francis is a 85 year old  (8/4/1932), who is being seen today for an episodic care visit at [facility] [place].   HPI information obtained from: facility chart records, facility staff and patient report. Today's concern is INR/Coumadin management for A. Fib and CVA    Bleeding Signs/Symptoms:  None  Thromboembolic Signs/Symptoms:  None    Medication Changes:  No  Dietary Changes:  No  Activity Changes: No  Bacterial/Viral Infection:  No    Missed Coumadin Doses:  None    On ASA: Yes - 81 mg po q day    Other Concerns:  No    OBJECTIVE:    INR Today:  3.0  Current Dose:  5mg Tu, Thu, Sat and 7.5mg all other days    ASSESSMENT:  Cerebral infarction due to embolism of right middle cerebral artery (H)  Paroxysmal atrial fibrillation (H)  Long term current use of anticoagulant therapy  Encounter for therapeutic drug monitoring      Therapeutic INR for goal of 2-3 however, upper limit with mild increase    PLAN:    New Dose: 5mg Tu, Thu, Sat, Sun and 7.5mg all other days      Next INR: 10/10/17    HANANE Gaspar CNP

## 2017-10-09 VITALS
WEIGHT: 196 LBS | TEMPERATURE: 96 F | SYSTOLIC BLOOD PRESSURE: 117 MMHG | OXYGEN SATURATION: 95 % | HEART RATE: 62 BPM | DIASTOLIC BLOOD PRESSURE: 62 MMHG | BODY MASS INDEX: 28.94 KG/M2

## 2017-10-09 NOTE — PROGRESS NOTES
[place] [facility]    Chief Complaint   Patient presents with     retirement Regulatory       HPI:    Tonio Francis is a 85 year old  (8/4/1932), who is being seen today for a federally mandated E/M visit at [facility] [place].  HPI information obtained from: facility staff and patient report.     Today's concerns are:   - Resident seen and examined.   - Reports sleep, appetite and BM are fine.   - DON reports that MPOA has not been activated and there is a question if Resident is competent to decide for himself.   - Resident thinks he is able to stand up, drove home one week ago back and forth driving a . When asked why did you go home, replies, went home to help his kids. Resident does not know why he is here for how long he has been here.     ---------------------------  - Past Medical, social, family histories, medications, and allergies reviewed and updated  - Medications reviewed: in the chart and EHR.   - Case Management:   I have reviewed the care plan and MDS and do agree with the plan. Patient's desire to return to the community is not present.  Information reviewed:  Medications, vital signs, orders, and nursing notes.      ROS:  -  Limited due to the Resident's dementia, otherwise negative except as in the HPI      Exam:  Vitals: /62  Pulse 62  Temp 96  F (35.6  C)  Wt 196 lb (88.9 kg)  SpO2 95%  BMI 28.94 kg/m2  BMI= Body mass index is 28.94 kg/(m^2).  GENERAL APPEARANCE:  Alert, in no distress  ENT:  Mouth and posterior oropharynx normal, moist mucous membranes, edentulous  with denture plateboth levels  EYES: Right eye: ptosis and right corneal calcification.   RESP:  respiratory effort and palpation of chest normal, lungs clear to auscultation , no respiratory distress  CV:  Palpation and auscultation of heart done , regular rate and rhythm, no murmur, rub, or gallop, no edema  ABDOMEN:  normal bowel sounds, soft, nontender, no hepatosplenomegaly or other  masses  M/S:   Gait and station abnormal. LUE in sling. .   SKIN:  Inspection of skin and subcutaneous tissue baseline, Palpation of skin and subcutaneous tissue baseline, Mepilex over left dorsal surface of the wrist  NEURO:    Tremor right hand. right ptosis, right facial droop. Ms strength 0/5 HEENA/LLE, hand  4/5 right side   PSYCH:  [place]Ox person[place] Poor and judgement poor, memory impaired.  Speech response delayed    Lab/Diagnostic data:      ASSESSMENT/PLAN  Ischemic embolic stroke (H)  Hemiplegia of nondominant side as late effect of cerebrovascular disease (H)  - Right MCA.  - Left sided weakness, no change in the muscles strength, and unlikely to improve.   - Requires assistance with ADLs. Mechanical transfer. Had a fall recently but no injury reported.       Chronic atrial fibrillation (H)  Long term current use of anticoagulant therapy  -  on coumadin with INR followed closely     Cognitive Impairment:  - based on clinical exam and history, Mr. Francis does not seem to be able cognitively competent to make a decision regarding his medical issue. Will activate MPOA  - Will perform SLUM.   - follow.      Frail elderly  - Significant  Deficits requiring NH placement. Requiring extensive assistance from nursing. Up for meals only o/w spends the day resting in bed       Adjustment d/o with depressed mood:  - seems doing fine, will reduce Prozac to 10 mg, continue to monitor.     Orders:  1.  SLUM  2.  Activate MPOA.   3.  Reduce Prozac to 10mg      Electronically signed by:  Mauri Zelaya MD

## 2017-10-10 ENCOUNTER — NURSING HOME VISIT (OUTPATIENT)
Dept: GERIATRICS | Facility: CLINIC | Age: 82
End: 2017-10-10
Payer: COMMERCIAL

## 2017-10-10 DIAGNOSIS — Z51.81 ENCOUNTER FOR THERAPEUTIC DRUG MONITORING: ICD-10-CM

## 2017-10-10 DIAGNOSIS — I63.411 CEREBRAL INFARCTION DUE TO EMBOLISM OF RIGHT MIDDLE CEREBRAL ARTERY (H): Primary | ICD-10-CM

## 2017-10-10 DIAGNOSIS — I48.0 PAROXYSMAL ATRIAL FIBRILLATION (H): ICD-10-CM

## 2017-10-10 DIAGNOSIS — Z79.01 LONG TERM CURRENT USE OF ANTICOAGULANT THERAPY: ICD-10-CM

## 2017-10-10 PROCEDURE — 99308 SBSQ NF CARE LOW MDM 20: CPT | Performed by: NURSE PRACTITIONER

## 2017-10-10 PROCEDURE — 99207 ZZC CDG-CORRECTLY CODED, REVIEWED AND AGREE: CPT | Performed by: NURSE PRACTITIONER

## 2017-10-10 NOTE — PROGRESS NOTES
[place] [facility]    HPI:    Tonio Francis is a 85 year old  (8/4/1932), who is being seen today for an episodic care visit at The [facility] at [place].   HPI information obtained from: facility chart records, facility staff and patient report. Today's concern is INR/Coumadin management for A. Fib and CVA    Bleeding Signs/Symptoms:  None  Thromboembolic Signs/Symptoms:  None    Medication Changes:  No  Dietary Changes:  No  Activity Changes: No  Bacterial/Viral Infection:  No    Missed Coumadin Doses:  None    On ASA: Yes - 81 mg po q day    Other Concerns:  No    OBJECTIVE:    INR Today:  2.2  Current Dose:  5mg Tu, Th, Sat, Sun and 7.5mg all other days    ASSESSMENT:  Cerebral infarction due to embolism of right middle cerebral artery (H)  Paroxysmal atrial fibrillation (H)  Long term current use of anticoagulant therapy  Encounter for therapeutic drug monitoring    Therapeutic INR for goal of 2-3    PLAN:    New Dose: No Change      Next INR: 10/17/17    HANANE Gaspar CNP

## 2017-10-11 ENCOUNTER — NURSING HOME VISIT (OUTPATIENT)
Dept: GERIATRICS | Facility: CLINIC | Age: 82
End: 2017-10-11
Payer: COMMERCIAL

## 2017-10-11 DIAGNOSIS — R54 FRAIL ELDERLY: ICD-10-CM

## 2017-10-11 DIAGNOSIS — I48.20 CHRONIC ATRIAL FIBRILLATION (H): ICD-10-CM

## 2017-10-11 DIAGNOSIS — F43.21 ADJUSTMENT DISORDER WITH DEPRESSED MOOD: ICD-10-CM

## 2017-10-11 DIAGNOSIS — I63.411 CEREBRAL INFARCTION DUE TO EMBOLISM OF RIGHT MIDDLE CEREBRAL ARTERY (H): Primary | ICD-10-CM

## 2017-10-11 DIAGNOSIS — R41.89 COGNITIVE IMPAIRMENT: ICD-10-CM

## 2017-10-11 DIAGNOSIS — Z79.01 LONG TERM CURRENT USE OF ANTICOAGULANT THERAPY: ICD-10-CM

## 2017-10-11 DIAGNOSIS — I69.959 HEMIPLEGIA OF NONDOMINANT SIDE AS LATE EFFECT OF CEREBROVASCULAR DISEASE (H): ICD-10-CM

## 2017-10-11 PROCEDURE — 99207 ZZC CDG-CORRECTLY CODED, REVIEWED AND AGREE: CPT | Performed by: FAMILY MEDICINE

## 2017-10-11 PROCEDURE — 99310 SBSQ NF CARE HIGH MDM 45: CPT | Performed by: FAMILY MEDICINE

## 2017-10-17 ENCOUNTER — NURSING HOME VISIT (OUTPATIENT)
Dept: GERIATRICS | Facility: CLINIC | Age: 82
End: 2017-10-17
Payer: COMMERCIAL

## 2017-10-17 DIAGNOSIS — Z51.81 ENCOUNTER FOR THERAPEUTIC DRUG MONITORING: ICD-10-CM

## 2017-10-17 DIAGNOSIS — I63.411 CEREBRAL INFARCTION DUE TO EMBOLISM OF RIGHT MIDDLE CEREBRAL ARTERY (H): Primary | ICD-10-CM

## 2017-10-17 DIAGNOSIS — Z79.01 LONG TERM CURRENT USE OF ANTICOAGULANT THERAPY: ICD-10-CM

## 2017-10-17 DIAGNOSIS — I48.20 CHRONIC ATRIAL FIBRILLATION (H): ICD-10-CM

## 2017-10-17 PROCEDURE — 99308 SBSQ NF CARE LOW MDM 20: CPT | Performed by: NURSE PRACTITIONER

## 2017-10-17 NOTE — PROGRESS NOTES
[place] [facility]    HPI:    Tonio Francis is a 85 year old  (8/4/1932), who is being seen today for an episodic care visit at [facility] [place].   HPI information obtained from: facility chart records, facility staff and patient report. Today's concern is INR/Coumadin management for Hx CVA    Bleeding Signs/Symptoms:  None  Thromboembolic Signs/Symptoms:  None    Medication Changes:  No  Dietary Changes:  No  Activity Changes: No  Bacterial/Viral Infection:  No    Missed Coumadin Doses:  None    On ASA: Yes - 81 mg po q day    Other Concerns:  No    OBJECTIVE:    INR Today:  2.1  Current Dose:  5mg on Sun, Tue, Thu, Sat and 7.5mg on Mon, Wed, Fri    ASSESSMENT:  Cerebral infarction due to embolism of right middle cerebral artery (H)  Chronic atrial fibrillation (H)  Long term current use of anticoagulant therapy  Encounter for therapeutic drug monitoring    Therapeutic INR for goal of 2-3    PLAN:    New Dose: No Change      Next INR: 10/24/17      HANANE Gaspar CNP

## 2017-10-24 ENCOUNTER — DOCUMENTATION ONLY (OUTPATIENT)
Dept: OTHER | Facility: CLINIC | Age: 82
End: 2017-10-24

## 2017-10-24 ENCOUNTER — NURSING HOME VISIT (OUTPATIENT)
Dept: GERIATRICS | Facility: CLINIC | Age: 82
End: 2017-10-24
Payer: COMMERCIAL

## 2017-10-24 DIAGNOSIS — Z79.01 LONG TERM CURRENT USE OF ANTICOAGULANT THERAPY: ICD-10-CM

## 2017-10-24 DIAGNOSIS — I63.411 CEREBRAL INFARCTION DUE TO EMBOLISM OF RIGHT MIDDLE CEREBRAL ARTERY (H): Primary | ICD-10-CM

## 2017-10-24 DIAGNOSIS — Z51.81 ENCOUNTER FOR THERAPEUTIC DRUG MONITORING: ICD-10-CM

## 2017-10-24 DIAGNOSIS — I48.20 CHRONIC ATRIAL FIBRILLATION (H): ICD-10-CM

## 2017-10-24 PROCEDURE — 99308 SBSQ NF CARE LOW MDM 20: CPT | Performed by: NURSE PRACTITIONER

## 2017-10-24 NOTE — PROGRESS NOTES
[place] [facility]    HPI:    Tonio Francis is a 85 year old  (8/4/1932), who is being seen today for an episodic care visit at [facility] [place].   HPI information obtained from: facility chart records, facility staff and patient report. Today's concern is INR/Coumadin management for A. Fib    Bleeding Signs/Symptoms:  None  Thromboembolic Signs/Symptoms:  None    Medication Changes:  No  Dietary Changes:  No  Activity Changes: No  Bacterial/Viral Infection:  No    Missed Coumadin Doses:  None    On ASA: Yes - 81 mg po q day    Other Concerns:  No    OBJECTIVE:    INR Today:  2.3  Current Dose:  5mg Sun, Tue, Thu, Sat and 7.5mg Mon, Wed, Fri    ASSESSMENT:  Cerebral infarction due to embolism of right middle cerebral artery (H)  Chronic atrial fibrillation (H)  Long term current use of anticoagulant therapy  Encounter for therapeutic drug monitoring  Therapeutic INR for goal of 2-3    PLAN:    New Dose: No Change      Next INR: 11/7/17     HANANE Gaspar CNP

## 2017-10-27 ENCOUNTER — DISCHARGE SUMMARY NURSING HOME (OUTPATIENT)
Dept: GERIATRICS | Facility: CLINIC | Age: 82
End: 2017-10-27
Payer: COMMERCIAL

## 2017-10-27 VITALS
OXYGEN SATURATION: 95 % | SYSTOLIC BLOOD PRESSURE: 132 MMHG | DIASTOLIC BLOOD PRESSURE: 74 MMHG | TEMPERATURE: 96.2 F | RESPIRATION RATE: 18 BRPM | HEART RATE: 88 BPM | BODY MASS INDEX: 28.5 KG/M2 | WEIGHT: 193 LBS

## 2017-10-27 DIAGNOSIS — I69.959 HEMIPLEGIA OF NONDOMINANT SIDE AS LATE EFFECT OF CEREBROVASCULAR DISEASE (H): ICD-10-CM

## 2017-10-27 DIAGNOSIS — R54 FRAIL ELDERLY: ICD-10-CM

## 2017-10-27 DIAGNOSIS — Z79.01 LONG TERM CURRENT USE OF ANTICOAGULANT THERAPY: ICD-10-CM

## 2017-10-27 DIAGNOSIS — I63.411 CEREBRAL INFARCTION DUE TO EMBOLISM OF RIGHT MIDDLE CEREBRAL ARTERY (H): Primary | ICD-10-CM

## 2017-10-27 DIAGNOSIS — I48.20 CHRONIC ATRIAL FIBRILLATION (H): ICD-10-CM

## 2017-10-27 DIAGNOSIS — R41.89 COGNITIVE IMPAIRMENT: ICD-10-CM

## 2017-10-27 DIAGNOSIS — F43.21 ADJUSTMENT DISORDER WITH DEPRESSED MOOD: ICD-10-CM

## 2017-10-27 PROCEDURE — 99316 NF DSCHRG MGMT 30 MIN+: CPT | Performed by: NURSE PRACTITIONER

## 2017-10-27 NOTE — PROGRESS NOTES
[facility] DISCHARGE SUMMARY    PATIENT'S NAME: Tonio Francis  YOB: 1932  MEDICAL RECORD NUMBER:  [id number]    PRIMARY CARE PROVIDER AND CLINIC RESPONSIBLE AFTER TRANSFER: Shira Bar [address] / [place] [address]     CODE STATUS/ADVANCE DIRECTIVES DISCUSSION:   DNR / DNI       Allergies   Allergen Reactions     Novocain [Procaine]      Other reaction(s): Dizziness  Lightheadedness    (Novocaine)       TRANSFERRING PROVIDERS: HANANE Gaspar CNP,   DATE OF SNF ADMISSION:  March / 08 / 2017  DATE OF SNF (anticipated) DISCHARGE: October / 30 / 2017  DISCHARGE DISPOSITION: Non-[facility]   Nursing Facility: [facility] [place] 3/6/17 to 3/8/17.     Condition on Discharge:  Stable.  Function:  Dependent for all ADLs.   Cognitive Scores: pt oriented to person, current situation, slow to respond    Equipment: wheelchair    DISCHARGE DIAGNOSIS:   1. Cerebral infarction due to embolism of right middle cerebral artery (H)    2. Chronic atrial fibrillation (H)    3. Long term current use of anticoagulant therapy    4. Hemiplegia of nondominant side as late effect of cerebrovascular disease (H)    5. Cognitive impairment    6. Frail elderly    7. Adjustment disorder with depressed mood        HPI Nursing Facility Course:  HPI information obtained from: facility chart records, facility staff, patient report and [facility] chart review.  Cerebral infarction due to embolism of right middle cerebral artery (H)  Chronic atrial fibrillation (H)  Long term current use of anticoagulant therapy  Hemiplegia of nondominant side as late effect of cerebrovascular disease (H)  On coumadin, next INR due 10/31  Spends most of day in bed watching spiritual programs on TV. Left sided hemiparesis.  Requires assist with all ADLs.  Has G-tube, has not required tube feedings for many months.  Recent discussion re:  Having it  removed.    Cognitive impairment  Mild cognitive impairment.  Slow to respond    Frail elderly  Dependent on others  Chronic condition, progressive decline anticipated    Adjustment disorder with depressed mood  Managed well with meds      PAST MEDICAL HISTORY:  has a past medical history of Cerebral infarction (H); Depressive disorder; and Hypertension.    DISCHARGE MEDICATIONS:  Current Outpatient Prescriptions   Medication Sig Dispense Refill     Warfarin Sodium (COUMADIN PO) 5mg Sun, Tue, Thu, Sat and 7.5mg Mon, Wed, Fri       hypromellose (ARTIFICIAL TEARS) 0.4 % SOLN ophthalmic solution Place 2 drops into both eyes 2 times daily       sennosides (SENOKOT) 8.6 MG tablet Take 2 tablets by mouth daily as needed for constipation       OMEPRAZOLE PO Take 20 mg by mouth every morning       loperamide (IMODIUM) 2 MG capsule Take 2 mg by mouth 4 times daily as needed for diarrhea And 2mg daily       Nutritional Supplements (NUTRITIONAL SUPPLEMENT PO) 2 calorie supplement.  240cc two times a day       ASPIRIN PO Take 81 mg by mouth daily       FLUOXETINE HCL PO Take 10 mg by mouth daily        METOPROLOL SUCCINATE ER PO 25 mg by Gastric Tube route 2 times daily        ROSUVASTATIN CALCIUM PO Take 20 mg by mouth daily       HYDROcodone-acetaminophen (NORCO) 5-325 MG per tablet Take 1 tablet by mouth 3 times daily        Acetaminophen (TYLENOL PO) Take 650 mg by mouth every 4 hours as needed for mild pain or fever         MEDICATION CHANGES/RATIONALE:   Coumadin adjustments  Controlled medications sent with patient: not applicable/none     ROS:    4 point ROS including Respiratory, CV, GI and , other than that noted in the HPI,  is negative    Physical Exam:   Vitals: /74  Pulse 88  Temp 96.2  F (35.7  C)  Resp 18  Wt 193 lb (87.5 kg)  SpO2 95%  BMI 28.5 kg/m2  BMI= Body mass index is 28.5 kg/(m^2).    GENERAL APPEARANCE:  Alert, engaged and cooperative, slow to respond  ENT:  Mouth and posterior oropharynx  normal, moist mucous membranes, [place], no vision in right eye  EYES:  glasses,   RESP:  lungs clear to auscultation , no respiratory distress  CV:  Palpation and auscultation of heart done , regular rate and rhythm, no murmur, rub, or gallop, no edema  ABDOMEN:  normal bowel sounds, soft, nontender, no hepatosplenomegaly or other masses  M/S:   Gait and station abnormal bed or W/C bound  SKIN:  G tube abdomen CDI, no alarming skin lesions  NEURO:   left sided che paresis, right side weak but functional  PSYCH:  oriented to self, environment, affect and mood normal    DISCHARGE PLAN:  another facility  Patient instructed to follow-up with:  PCP in 7-10 days       MTM referral needed and placed by this provider: No    Pending labs: INR 10/30  [facility] labs   Results for orders placed or performed in visit on 05/18/17   Basic metabolic panel   Result Value Ref Range    Sodium 144 133 - 144 mmol/L    Potassium 3.6 3.4 - 5.3 mmol/L    Chloride 107 94 - 109 mmol/L    Carbon Dioxide 29 20 - 32 mmol/L    Anion Gap 8 3 - 14 mmol/L    Glucose 85 70 - 99 mg/dL    Urea Nitrogen 9 7 - 30 mg/dL    Creatinine 0.70 0.66 - 1.25 mg/dL    GFR Estimate >90  Non  GFR Calc   >60 mL/min/1.7m2    GFR Estimate If Black >90   GFR Calc   >60 mL/min/1.7m2    Calcium 8.9 8.5 - 10.1 mg/dL   CBC with platelets   Result Value Ref Range    WBC 5.8 4.0 - 11.0 10e9/L    RBC Count 4.16 (L) 4.4 - 5.9 10e12/L    Hemoglobin 12.9 (L) 13.3 - 17.7 g/dL    Hematocrit 39.9 (L) 40.0 - 53.0 %    MCV 96 78 - 100 fl    MCH 31.0 26.5 - 33.0 pg    MCHC 32.3 31.5 - 36.5 g/dL    RDW 14.1 10.0 - 15.0 %    Platelet Count 223 150 - 450 10e9/L     Last Basic Metabolic Panel:  Lab Results   Component Value Date     05/18/2017      Lab Results   Component Value Date    POTASSIUM 3.6 05/18/2017     Lab Results   Component Value Date    CHLORIDE 107 05/18/2017     Lab Results   Component Value Date    BIBI 8.9 05/18/2017     Lab Results    Component Value Date    CO2 29 2017     Lab Results   Component Value Date    BUN 9 2017     Lab Results   Component Value Date    CR 0.70 2017     Lab Results   Component Value Date    GLC 85 2017             Face to Face and Medical Necessity Statement for DME Provider visit    Demographic Information on Tonio Francis:  Gender: male  : 1932  [id number] [address] [id number]  None (home)     Medical Record: [id number]  Social Security Number: [id number]  Primary Care Provider: Shira Bar  Insurance: Payor: BCBS / Plan: BCBS PLATINUM BLUE / Product Type: PPO /     HPI:   Tonio Francis is a 85 year old  (1932), who is being seen today for a face to face provider visit at [facility]; medical necessity statement for DME included. This patient requires the following:  DME ordered:  Hospital bed: fully electronic with air mattress    Medical Necessity Statement   Pt needing above DME with expected length of need of 99 months   due to medical necessity associated with following diagnosis:     Cerebral infarction due to embolism of right middle cerebral artery (H)  Chronic atrial fibrillation (H)  Long term current use of anticoagulant therapy  Hemiplegia of nondominant side as late effect of cerebrovascular disease (H)  Cognitive impairment  Frail elderly  Adjustment disorder with depressed mood      PMH   has a past medical history of Cerebral infarction (H); Depressive disorder; and Hypertension.  CURRENT MEDICATIONS  Current Outpatient Prescriptions   Medication Sig Dispense Refill     Warfarin Sodium (COUMADIN PO) 5mg Sun, Tue, Thu, Sat and 7.5mg Mon, Wed, Fri       hypromellose (ARTIFICIAL TEARS) 0.4 % SOLN ophthalmic solution Place 2 drops into both eyes 2 times daily       sennosides (SENOKOT) 8.6 MG tablet Take 2 tablets by mouth daily as needed for constipation       OMEPRAZOLE PO Take 20 mg by mouth every morning       loperamide (IMODIUM) 2 MG  capsule Take 2 mg by mouth 4 times daily as needed for diarrhea And 2mg daily       Nutritional Supplements (NUTRITIONAL SUPPLEMENT PO) 2 calorie supplement.  240cc two times a day       ASPIRIN PO Take 81 mg by mouth daily       FLUOXETINE HCL PO Take 10 mg by mouth daily        METOPROLOL SUCCINATE ER PO 25 mg by Gastric Tube route 2 times daily        ROSUVASTATIN CALCIUM PO Take 20 mg by mouth daily       HYDROcodone-acetaminophen (NORCO) 5-325 MG per tablet Take 1 tablet by mouth 3 times daily        Acetaminophen (TYLENOL PO) Take 650 mg by mouth every 4 hours as needed for mild pain or fever       ROS:4 point ROS including Respiratory, CV, GI and , other than that noted in the HPI,  is negative     EXAM  Vitals: /74  Pulse 88  Temp 96.2  F (35.7  C)  Resp 18  Wt 193 lb (87.5 kg)  SpO2 95%  BMI 28.5 kg/m2;BMI= Body mass index is 28.5 kg/(m^2).  See exam above     ASSESSMENT/PLAN:  1. Cerebral infarction due to embolism of right middle cerebral artery (H)    2. Chronic atrial fibrillation (H)    3. Long term current use of anticoagulant therapy    4. Hemiplegia of nondominant side as late effect of cerebrovascular disease (H)    5. Cognitive impairment    6. Frail elderly    7. Adjustment disorder with depressed mood        Orders:  1. Facility staff/TC to contact DME company to get their order form for provider to fill out    ELECTRONICALLY SIGNED BY BOB CERTIFIED PROVIDER:  HANANE Gaspar CNP   NPI: [id number]  [facility]  [address] [place] [address], [address]  [place] [address]        TOTAL DISCHARGE TIME:   Greater than 30 minutes  Electronically signed by:  HANANE Gaspar CNP

## 2024-05-08 NOTE — PROGRESS NOTES
Quality 130: Documentation Of Current Medications In The Medical Record: Current Medications Documented
[facility]  HPI:    Tonio Francis is a 84 year old  (8/4/1932), who is being seen today for an episodic care visit at [facility] [place]. Today's concern is INR/Coumadin management for A. Fib    Bleeding Signs/Symptoms:  None  Thromboembolic Signs/Symptoms:  None    Medication Changes:  No  Dietary Changes:  No  Activity Changes: No  Bacterial/Viral Infection:  No    Missed Coumadin Doses:  None    Other Concerns:  No    OBJECTIVE:    INR Today:  2.3  Current Dose:  5mg daily    ROS:  4 point ROS including Respiratory, CV, GI and , other than that noted in the HPI,  is negative    Physical Exam:  GENERAL APPEARANCE:  Alert, in no distress  CHEST/RESP:  respiratory effort normal, lung sounds CTA , no respiratory distress  CV:  Rate reg, rhythm reg, no murmur, no peripheral edema     ASSESSMENT:  Paroxysmal atrial fibrillation (H)  Long term current use of anticoagulant therapy  INR therapeutic. No signs of bleeding. The current medical regimen is effective;  continue present plan and medications.     Therapeutic INR for goal of 2-3    PLAN:    New Dose: No Change      Next INR: 2 weeks    Brooke Benavidez CNP   [facility]  [phone] voice mail       
Detail Level: Detailed
Quality 402: Tobacco Use And Help With Quitting Among Adolescents: Patient screened for tobacco and never smoked
Quality 431: Preventive Care And Screening: Unhealthy Alcohol Use - Screening: Patient not identified as an unhealthy alcohol user when screened for unhealthy alcohol use using a systematic screening method

## 2024-06-17 PROBLEM — Z71.89 ADVANCED DIRECTIVES, COUNSELING/DISCUSSION: Status: RESOLVED | Noted: 2017-03-14 | Resolved: 2024-06-17

## 2025-04-21 ENCOUNTER — TELEPHONE (OUTPATIENT)
Dept: GERIATRICS | Facility: CLINIC | Age: OVER 89
End: 2025-04-21
Payer: COMMERCIAL